# Patient Record
Sex: MALE | Race: WHITE | NOT HISPANIC OR LATINO | URBAN - METROPOLITAN AREA
[De-identification: names, ages, dates, MRNs, and addresses within clinical notes are randomized per-mention and may not be internally consistent; named-entity substitution may affect disease eponyms.]

---

## 2017-02-09 ENCOUNTER — COMMUNICATION - HEALTHEAST (OUTPATIENT)
Dept: FAMILY MEDICINE | Facility: CLINIC | Age: 22
End: 2017-02-09

## 2017-02-24 ENCOUNTER — OFFICE VISIT - HEALTHEAST (OUTPATIENT)
Dept: FAMILY MEDICINE | Facility: CLINIC | Age: 22
End: 2017-02-24

## 2017-02-24 DIAGNOSIS — M54.50 LOW BACK PAIN: ICD-10-CM

## 2017-04-06 ENCOUNTER — OFFICE VISIT - HEALTHEAST (OUTPATIENT)
Dept: FAMILY MEDICINE | Facility: CLINIC | Age: 22
End: 2017-04-06

## 2017-04-06 DIAGNOSIS — M54.9 BACK PAIN: ICD-10-CM

## 2017-04-06 DIAGNOSIS — M54.32 SCIATICA OF LEFT SIDE: ICD-10-CM

## 2017-04-06 RX ORDER — GABAPENTIN 100 MG/1
CAPSULE ORAL
Qty: 90 CAPSULE | Refills: 5 | Status: SHIPPED | OUTPATIENT
Start: 2017-04-06 | End: 2022-02-03

## 2017-04-14 ENCOUNTER — RECORDS - HEALTHEAST (OUTPATIENT)
Dept: ADMINISTRATIVE | Facility: OTHER | Age: 22
End: 2017-04-14

## 2017-04-21 ENCOUNTER — COMMUNICATION - HEALTHEAST (OUTPATIENT)
Dept: FAMILY MEDICINE | Facility: CLINIC | Age: 22
End: 2017-04-21

## 2017-05-25 ENCOUNTER — RECORDS - HEALTHEAST (OUTPATIENT)
Dept: ADMINISTRATIVE | Facility: OTHER | Age: 22
End: 2017-05-25

## 2018-02-19 ENCOUNTER — COMMUNICATION - HEALTHEAST (OUTPATIENT)
Dept: FAMILY MEDICINE | Facility: CLINIC | Age: 23
End: 2018-02-19

## 2018-02-20 ENCOUNTER — OFFICE VISIT - HEALTHEAST (OUTPATIENT)
Dept: FAMILY MEDICINE | Facility: CLINIC | Age: 23
End: 2018-02-20

## 2018-02-20 DIAGNOSIS — M54.50 LOW BACK PAIN: ICD-10-CM

## 2018-02-20 RX ORDER — CYCLOBENZAPRINE HCL 10 MG
10 TABLET ORAL PRN
Qty: 20 TABLET | Refills: 0 | Status: SHIPPED | OUTPATIENT
Start: 2018-02-20 | End: 2022-02-03

## 2018-03-13 ENCOUNTER — HOSPITAL ENCOUNTER (OUTPATIENT)
Dept: MRI IMAGING | Facility: CLINIC | Age: 23
Discharge: HOME | End: 2018-03-13
Attending: FAMILY MEDICINE

## 2018-03-13 DIAGNOSIS — M54.50 LOW BACK PAIN: ICD-10-CM

## 2018-03-14 ENCOUNTER — COMMUNICATION - HEALTHEAST (OUTPATIENT)
Dept: FAMILY MEDICINE | Facility: CLINIC | Age: 23
End: 2018-03-14

## 2018-04-26 ENCOUNTER — RECORDS - HEALTHEAST (OUTPATIENT)
Dept: GENERAL RADIOLOGY | Facility: CLINIC | Age: 23
End: 2018-04-26

## 2018-04-26 ENCOUNTER — OFFICE VISIT - HEALTHEAST (OUTPATIENT)
Dept: RHEUMATOLOGY | Facility: CLINIC | Age: 23
End: 2018-04-26

## 2018-04-26 DIAGNOSIS — M76.32 ILIOTIBIAL BAND SYNDROME OF BOTH SIDES: ICD-10-CM

## 2018-04-26 DIAGNOSIS — E55.9 VITAMIN D DEFICIENCY: ICD-10-CM

## 2018-04-26 DIAGNOSIS — M25.562 CHRONIC PAIN OF LEFT KNEE: ICD-10-CM

## 2018-04-26 DIAGNOSIS — M76.31 ILIOTIBIAL BAND SYNDROME OF BOTH SIDES: ICD-10-CM

## 2018-04-26 DIAGNOSIS — Z15.89 HLA B27 POSITIVE: ICD-10-CM

## 2018-04-26 DIAGNOSIS — M25.562 PAIN IN LEFT KNEE: ICD-10-CM

## 2018-04-26 DIAGNOSIS — M54.42 CHRONIC LOW BACK PAIN WITH LEFT-SIDED SCIATICA: ICD-10-CM

## 2018-04-26 DIAGNOSIS — L70.9 ACNE, UNSPECIFIED ACNE TYPE: ICD-10-CM

## 2018-04-26 DIAGNOSIS — R20.2 LEFT LEG PARESTHESIAS: ICD-10-CM

## 2018-04-26 DIAGNOSIS — Z78.9 REGULAR ALCOHOL CONSUMPTION: ICD-10-CM

## 2018-04-26 DIAGNOSIS — G89.29 CHRONIC LOW BACK PAIN WITH LEFT-SIDED SCIATICA: ICD-10-CM

## 2018-04-26 DIAGNOSIS — G89.29 CHRONIC PAIN OF LEFT KNEE: ICD-10-CM

## 2018-04-26 DIAGNOSIS — R19.5 LOOSE STOOLS: ICD-10-CM

## 2018-04-26 DIAGNOSIS — G89.29 OTHER CHRONIC PAIN: ICD-10-CM

## 2018-04-26 LAB
ALBUMIN SERPL-MCNC: 4 G/DL (ref 3.5–5)
ALT SERPL W P-5'-P-CCNC: 18 U/L (ref 0–45)
AST SERPL W P-5'-P-CCNC: 16 U/L (ref 0–40)
C REACTIVE PROTEIN LHE: <0.1 MG/DL (ref 0–0.8)
CALCIUM SERPL-MCNC: 9.9 MG/DL (ref 8.5–10.5)
CREAT SERPL-MCNC: 0.99 MG/DL (ref 0.7–1.3)
ERYTHROCYTE [DISTWIDTH] IN BLOOD BY AUTOMATED COUNT: 10.8 % (ref 11–14.5)
ERYTHROCYTE [SEDIMENTATION RATE] IN BLOOD BY WESTERGREN METHOD: 2 MM/HR (ref 0–15)
GFR SERPL CREATININE-BSD FRML MDRD: >60 ML/MIN/1.73M2
HCT VFR BLD AUTO: 44.9 % (ref 40–54)
HGB BLD-MCNC: 15.6 G/DL (ref 14–18)
MCH RBC QN AUTO: 31.4 PG (ref 27–34)
MCHC RBC AUTO-ENTMCNC: 34.7 G/DL (ref 32–36)
MCV RBC AUTO: 91 FL (ref 80–100)
PLATELET # BLD AUTO: 327 THOU/UL (ref 140–440)
PMV BLD AUTO: 7.2 FL (ref 7–10)
RBC # BLD AUTO: 4.95 MILL/UL (ref 4.4–6.2)
WBC: 7.9 THOU/UL (ref 4–11)

## 2018-04-26 ASSESSMENT — MIFFLIN-ST. JEOR: SCORE: 1857.16

## 2018-04-27 LAB — 25(OH)D3 SERPL-MCNC: 10.6 NG/ML (ref 30–80)

## 2018-04-30 ENCOUNTER — COMMUNICATION - HEALTHEAST (OUTPATIENT)
Dept: RHEUMATOLOGY | Facility: CLINIC | Age: 23
End: 2018-04-30

## 2018-04-30 DIAGNOSIS — E55.9 VITAMIN D DEFICIENCY: ICD-10-CM

## 2018-05-10 ENCOUNTER — OFFICE VISIT - HEALTHEAST (OUTPATIENT)
Dept: PHYSICAL THERAPY | Facility: REHABILITATION | Age: 23
End: 2018-05-10

## 2018-05-10 DIAGNOSIS — R53.1 DECREASED STRENGTH: ICD-10-CM

## 2018-05-10 DIAGNOSIS — M54.16 CHRONIC LEFT LUMBAR RADICULOPATHY: ICD-10-CM

## 2018-05-14 ENCOUNTER — HOSPITAL ENCOUNTER (OUTPATIENT)
Dept: MRI IMAGING | Facility: CLINIC | Age: 23
Discharge: HOME | End: 2018-05-14
Attending: INTERNAL MEDICINE

## 2018-05-14 ENCOUNTER — RECORDS - HEALTHEAST (OUTPATIENT)
Dept: ADMINISTRATIVE | Facility: OTHER | Age: 23
End: 2018-05-14

## 2018-05-14 DIAGNOSIS — M54.42 CHRONIC LOW BACK PAIN WITH LEFT-SIDED SCIATICA: ICD-10-CM

## 2018-05-14 DIAGNOSIS — Z15.89 HLA B27 POSITIVE: ICD-10-CM

## 2018-05-14 DIAGNOSIS — G89.29 CHRONIC LOW BACK PAIN WITH LEFT-SIDED SCIATICA: ICD-10-CM

## 2018-05-14 ASSESSMENT — MIFFLIN-ST. JEOR: SCORE: 1886.64

## 2018-05-15 ENCOUNTER — HOSPITAL ENCOUNTER (OUTPATIENT)
Dept: PHYSICAL MEDICINE AND REHAB | Facility: CLINIC | Age: 23
Discharge: HOME | End: 2018-05-15
Attending: INTERNAL MEDICINE

## 2018-05-15 DIAGNOSIS — G89.29 CHRONIC LEFT-SIDED LOW BACK PAIN WITH LEFT-SIDED SCIATICA: ICD-10-CM

## 2018-05-15 DIAGNOSIS — M54.42 CHRONIC LOW BACK PAIN WITH LEFT-SIDED SCIATICA: ICD-10-CM

## 2018-05-15 DIAGNOSIS — R20.2 LEFT LEG PARESTHESIAS: ICD-10-CM

## 2018-05-15 DIAGNOSIS — G89.29 CHRONIC LOW BACK PAIN WITH LEFT-SIDED SCIATICA: ICD-10-CM

## 2018-05-15 DIAGNOSIS — M54.42 CHRONIC LEFT-SIDED LOW BACK PAIN WITH LEFT-SIDED SCIATICA: ICD-10-CM

## 2018-05-17 ENCOUNTER — COMMUNICATION - HEALTHEAST (OUTPATIENT)
Dept: RHEUMATOLOGY | Facility: CLINIC | Age: 23
End: 2018-05-17

## 2018-05-18 ENCOUNTER — COMMUNICATION - HEALTHEAST (OUTPATIENT)
Dept: RHEUMATOLOGY | Facility: CLINIC | Age: 23
End: 2018-05-18

## 2018-06-20 ENCOUNTER — OFFICE VISIT - HEALTHEAST (OUTPATIENT)
Dept: RHEUMATOLOGY | Facility: CLINIC | Age: 23
End: 2018-06-20

## 2018-06-20 ENCOUNTER — COMMUNICATION - HEALTHEAST (OUTPATIENT)
Dept: TELEHEALTH | Facility: CLINIC | Age: 23
End: 2018-06-20

## 2018-06-20 DIAGNOSIS — M76.31 ILIOTIBIAL BAND SYNDROME OF BOTH SIDES: ICD-10-CM

## 2018-06-20 DIAGNOSIS — G89.29 CHRONIC BILATERAL LOW BACK PAIN WITH LEFT-SIDED SCIATICA: ICD-10-CM

## 2018-06-20 DIAGNOSIS — Z78.9 REGULAR ALCOHOL CONSUMPTION: ICD-10-CM

## 2018-06-20 DIAGNOSIS — Z79.899 HIGH RISK MEDICATION USE: ICD-10-CM

## 2018-06-20 DIAGNOSIS — R19.5 LOOSE STOOLS: ICD-10-CM

## 2018-06-20 DIAGNOSIS — M45.8 ANKYLOSING SPONDYLITIS OF SACRAL REGION (H): ICD-10-CM

## 2018-06-20 DIAGNOSIS — M54.42 CHRONIC BILATERAL LOW BACK PAIN WITH LEFT-SIDED SCIATICA: ICD-10-CM

## 2018-06-20 DIAGNOSIS — M76.32 ILIOTIBIAL BAND SYNDROME OF BOTH SIDES: ICD-10-CM

## 2018-06-20 DIAGNOSIS — L70.9 ACNE, UNSPECIFIED ACNE TYPE: ICD-10-CM

## 2018-06-20 ASSESSMENT — MIFFLIN-ST. JEOR: SCORE: 1895.72

## 2018-06-21 LAB
HBV SURFACE AG SERPL QL IA: NEGATIVE
HCV AB SERPL QL IA: NEGATIVE

## 2018-06-22 LAB
G6PD RBC-CCNT: 9.6 U/G HB (ref 8.8–13.4)
QTF INTERPRETATION: NORMAL
QTF MITOGEN - NIL: 8.44 IU/ML
QTF NIL: 0.04 IU/ML
QTF RESULT: NEGATIVE
QTF TB ANTIGEN - NIL: 0.02 IU/ML

## 2018-06-25 ENCOUNTER — COMMUNICATION - HEALTHEAST (OUTPATIENT)
Dept: RHEUMATOLOGY | Facility: CLINIC | Age: 23
End: 2018-06-25

## 2018-09-17 ENCOUNTER — OFFICE VISIT - HEALTHEAST (OUTPATIENT)
Dept: RHEUMATOLOGY | Facility: CLINIC | Age: 23
End: 2018-09-17

## 2018-09-17 ENCOUNTER — RECORDS - HEALTHEAST (OUTPATIENT)
Dept: GENERAL RADIOLOGY | Facility: CLINIC | Age: 23
End: 2018-09-17

## 2018-09-17 DIAGNOSIS — Z79.899 OTHER LONG TERM (CURRENT) DRUG THERAPY: ICD-10-CM

## 2018-09-17 DIAGNOSIS — M45.8 ANKYLOSING SPONDYLITIS OF SACRAL REGION (H): ICD-10-CM

## 2018-09-17 DIAGNOSIS — M45.8 ANKYLOSING SPONDYLITIS SACRAL AND SACROCOCCYGEAL REGION (H): ICD-10-CM

## 2018-09-17 DIAGNOSIS — Z79.899 HIGH RISK MEDICATION USE: ICD-10-CM

## 2018-09-17 LAB
ALBUMIN SERPL-MCNC: 4.3 G/DL (ref 3.5–5)
ALT SERPL W P-5'-P-CCNC: 17 U/L (ref 0–45)
AST SERPL W P-5'-P-CCNC: 16 U/L (ref 0–40)
CREAT SERPL-MCNC: 0.83 MG/DL (ref 0.7–1.3)
ERYTHROCYTE [DISTWIDTH] IN BLOOD BY AUTOMATED COUNT: 11.7 % (ref 11–14.5)
GFR SERPL CREATININE-BSD FRML MDRD: >60 ML/MIN/1.73M2
HCT VFR BLD AUTO: 45.8 % (ref 40–54)
HGB BLD-MCNC: 15.4 G/DL (ref 14–18)
MCH RBC QN AUTO: 31.4 PG (ref 27–34)
MCHC RBC AUTO-ENTMCNC: 33.6 G/DL (ref 32–36)
MCV RBC AUTO: 93 FL (ref 80–100)
PLATELET # BLD AUTO: 262 THOU/UL (ref 140–440)
PMV BLD AUTO: 7.6 FL (ref 7–10)
RBC # BLD AUTO: 4.9 MILL/UL (ref 4.4–6.2)
WBC: 7.1 THOU/UL (ref 4–11)

## 2018-09-17 ASSESSMENT — MIFFLIN-ST. JEOR: SCORE: 1868.5

## 2018-10-29 ENCOUNTER — COMMUNICATION - HEALTHEAST (OUTPATIENT)
Dept: ADMINISTRATIVE | Facility: CLINIC | Age: 23
End: 2018-10-29

## 2018-10-29 DIAGNOSIS — M45.8 ANKYLOSING SPONDYLITIS OF SACRAL REGION (H): ICD-10-CM

## 2018-10-30 ENCOUNTER — COMMUNICATION - HEALTHEAST (OUTPATIENT)
Dept: ADMINISTRATIVE | Facility: CLINIC | Age: 23
End: 2018-10-30

## 2018-10-30 DIAGNOSIS — M45.8 ANKYLOSING SPONDYLITIS OF SACRAL REGION (H): ICD-10-CM

## 2019-04-19 ENCOUNTER — AMBULATORY - HEALTHEAST (OUTPATIENT)
Dept: LAB | Facility: CLINIC | Age: 24
End: 2019-04-19

## 2019-04-19 DIAGNOSIS — Z79.899 HIGH RISK MEDICATION USE: ICD-10-CM

## 2019-04-19 DIAGNOSIS — M45.8 ANKYLOSING SPONDYLITIS OF SACRAL REGION (H): ICD-10-CM

## 2019-04-19 LAB
ALBUMIN SERPL-MCNC: 4.3 G/DL (ref 3.5–5)
ALT SERPL W P-5'-P-CCNC: 213 U/L (ref 0–45)
AST SERPL W P-5'-P-CCNC: 409 U/L (ref 0–40)
CREAT SERPL-MCNC: 1.1 MG/DL (ref 0.7–1.3)
ERYTHROCYTE [DISTWIDTH] IN BLOOD BY AUTOMATED COUNT: 11.7 % (ref 11–14.5)
GFR SERPL CREATININE-BSD FRML MDRD: >60 ML/MIN/1.73M2
HCT VFR BLD AUTO: 47.8 % (ref 40–54)
HGB BLD-MCNC: 16.1 G/DL (ref 14–18)
MCH RBC QN AUTO: 31 PG (ref 27–34)
MCHC RBC AUTO-ENTMCNC: 33.6 G/DL (ref 32–36)
MCV RBC AUTO: 92 FL (ref 80–100)
PLATELET # BLD AUTO: 307 THOU/UL (ref 140–440)
PMV BLD AUTO: 7.8 FL (ref 7–10)
RBC # BLD AUTO: 5.17 MILL/UL (ref 4.4–6.2)
WBC: 7.8 THOU/UL (ref 4–11)

## 2019-04-22 ENCOUNTER — COMMUNICATION - HEALTHEAST (OUTPATIENT)
Dept: RHEUMATOLOGY | Facility: CLINIC | Age: 24
End: 2019-04-22

## 2019-04-24 ENCOUNTER — HOSPITAL ENCOUNTER (OUTPATIENT)
Dept: ULTRASOUND IMAGING | Facility: CLINIC | Age: 24
Discharge: HOME | End: 2019-04-24
Attending: FAMILY MEDICINE

## 2019-04-24 ENCOUNTER — OFFICE VISIT - HEALTHEAST (OUTPATIENT)
Dept: FAMILY MEDICINE | Facility: CLINIC | Age: 24
End: 2019-04-24

## 2019-04-24 ENCOUNTER — COMMUNICATION - HEALTHEAST (OUTPATIENT)
Dept: FAMILY MEDICINE | Facility: CLINIC | Age: 24
End: 2019-04-24

## 2019-04-24 DIAGNOSIS — R74.8 ELEVATED LIVER ENZYMES: ICD-10-CM

## 2019-04-24 LAB
ALBUMIN SERPL-MCNC: 4.3 G/DL (ref 3.5–5)
ALP SERPL-CCNC: 57 U/L (ref 45–120)
ALT SERPL W P-5'-P-CCNC: 98 U/L (ref 0–45)
ANION GAP SERPL CALCULATED.3IONS-SCNC: 9 MMOL/L (ref 5–18)
AST SERPL W P-5'-P-CCNC: 46 U/L (ref 0–40)
BILIRUB SERPL-MCNC: 0.8 MG/DL (ref 0–1)
BUN SERPL-MCNC: 17 MG/DL (ref 8–22)
CALCIUM SERPL-MCNC: 10.1 MG/DL (ref 8.5–10.5)
CHLORIDE BLD-SCNC: 102 MMOL/L (ref 98–107)
CHOLEST SERPL-MCNC: 208 MG/DL
CO2 SERPL-SCNC: 28 MMOL/L (ref 22–31)
CREAT SERPL-MCNC: 1.08 MG/DL (ref 0.7–1.3)
FASTING STATUS PATIENT QL REPORTED: YES
GFR SERPL CREATININE-BSD FRML MDRD: >60 ML/MIN/1.73M2
GGT SERPL-CCNC: 37 U/L (ref 0–50)
GLUCOSE BLD-MCNC: 97 MG/DL (ref 70–125)
HDLC SERPL-MCNC: 38 MG/DL
LDLC SERPL CALC-MCNC: 128 MG/DL
POTASSIUM BLD-SCNC: 4.1 MMOL/L (ref 3.5–5)
PROT SERPL-MCNC: 7.1 G/DL (ref 6–8)
SODIUM SERPL-SCNC: 139 MMOL/L (ref 136–145)
TRIGL SERPL-MCNC: 212 MG/DL

## 2019-04-26 ENCOUNTER — COMMUNICATION - HEALTHEAST (OUTPATIENT)
Dept: FAMILY MEDICINE | Facility: CLINIC | Age: 24
End: 2019-04-26

## 2019-04-26 LAB
HAV IGM SERPL QL IA: NEGATIVE
HBV CORE IGM SERPL QL IA: NEGATIVE
HBV SURFACE AG SERPL QL IA: NEGATIVE
HCV AB SERPL QL IA: NEGATIVE
MITOCHONDRIAL M2 ABY IGG: <1 U/ML

## 2019-05-09 ENCOUNTER — COMMUNICATION - HEALTHEAST (OUTPATIENT)
Dept: RHEUMATOLOGY | Facility: CLINIC | Age: 24
End: 2019-05-09

## 2019-05-09 DIAGNOSIS — M45.8 ANKYLOSING SPONDYLITIS OF SACRAL REGION (H): ICD-10-CM

## 2019-05-16 ENCOUNTER — COMMUNICATION - HEALTHEAST (OUTPATIENT)
Dept: RHEUMATOLOGY | Facility: CLINIC | Age: 24
End: 2019-05-16

## 2019-05-16 DIAGNOSIS — M45.8 ANKYLOSING SPONDYLITIS OF SACRAL REGION (H): ICD-10-CM

## 2019-05-20 ENCOUNTER — RECORDS - HEALTHEAST (OUTPATIENT)
Dept: ADMINISTRATIVE | Facility: OTHER | Age: 24
End: 2019-05-20

## 2019-05-28 ENCOUNTER — COMMUNICATION - HEALTHEAST (OUTPATIENT)
Dept: FAMILY MEDICINE | Facility: CLINIC | Age: 24
End: 2019-05-28

## 2019-05-30 ENCOUNTER — RECORDS - HEALTHEAST (OUTPATIENT)
Dept: ADMINISTRATIVE | Facility: OTHER | Age: 24
End: 2019-05-30

## 2019-05-31 ENCOUNTER — AMBULATORY - HEALTHEAST (OUTPATIENT)
Dept: FAMILY MEDICINE | Facility: CLINIC | Age: 24
End: 2019-05-31

## 2019-06-05 ENCOUNTER — OFFICE VISIT - HEALTHEAST (OUTPATIENT)
Dept: RHEUMATOLOGY | Facility: CLINIC | Age: 24
End: 2019-06-05

## 2019-06-05 DIAGNOSIS — M54.42 CHRONIC BILATERAL LOW BACK PAIN WITH LEFT-SIDED SCIATICA: ICD-10-CM

## 2019-06-05 DIAGNOSIS — E55.9 VITAMIN D DEFICIENCY: ICD-10-CM

## 2019-06-05 DIAGNOSIS — M45.8 ANKYLOSING SPONDYLITIS OF SACRAL REGION (H): ICD-10-CM

## 2019-06-05 DIAGNOSIS — Z79.899 HIGH RISK MEDICATION USE: ICD-10-CM

## 2019-06-05 DIAGNOSIS — G89.29 CHRONIC BILATERAL LOW BACK PAIN WITH LEFT-SIDED SCIATICA: ICD-10-CM

## 2019-07-03 ENCOUNTER — AMBULATORY - HEALTHEAST (OUTPATIENT)
Dept: LAB | Facility: CLINIC | Age: 24
End: 2019-07-03

## 2019-07-03 DIAGNOSIS — E55.9 VITAMIN D DEFICIENCY: ICD-10-CM

## 2019-07-03 DIAGNOSIS — M45.8 ANKYLOSING SPONDYLITIS OF SACRAL REGION (H): ICD-10-CM

## 2019-07-03 DIAGNOSIS — Z79.899 HIGH RISK MEDICATION USE: ICD-10-CM

## 2019-07-03 LAB
ALBUMIN SERPL-MCNC: 3.9 G/DL (ref 3.5–5)
ALT SERPL W P-5'-P-CCNC: 24 U/L (ref 0–45)
AST SERPL W P-5'-P-CCNC: 15 U/L (ref 0–40)
CALCIUM SERPL-MCNC: 9.6 MG/DL (ref 8.5–10.5)
CREAT SERPL-MCNC: 1.17 MG/DL (ref 0.7–1.3)
ERYTHROCYTE [DISTWIDTH] IN BLOOD BY AUTOMATED COUNT: 11.6 % (ref 11–14.5)
GFR SERPL CREATININE-BSD FRML MDRD: >60 ML/MIN/1.73M2
HCT VFR BLD AUTO: 44.1 % (ref 40–54)
HGB BLD-MCNC: 15 G/DL (ref 14–18)
MCH RBC QN AUTO: 31 PG (ref 27–34)
MCHC RBC AUTO-ENTMCNC: 33.9 G/DL (ref 32–36)
MCV RBC AUTO: 91 FL (ref 80–100)
PLATELET # BLD AUTO: 246 THOU/UL (ref 140–440)
PMV BLD AUTO: 7.6 FL (ref 7–10)
RBC # BLD AUTO: 4.83 MILL/UL (ref 4.4–6.2)
WBC: 6.7 THOU/UL (ref 4–11)

## 2019-07-05 LAB — 25(OH)D3 SERPL-MCNC: 8.9 NG/ML (ref 30–80)

## 2019-07-15 ENCOUNTER — COMMUNICATION - HEALTHEAST (OUTPATIENT)
Dept: RHEUMATOLOGY | Facility: CLINIC | Age: 24
End: 2019-07-15

## 2019-07-15 DIAGNOSIS — E55.9 VITAMIN D DEFICIENCY: ICD-10-CM

## 2019-07-18 RX ORDER — ERGOCALCIFEROL 1.25 MG/1
CAPSULE ORAL
Qty: 16 CAPSULE | Refills: 0 | Status: SHIPPED | OUTPATIENT
Start: 2019-07-18 | End: 2022-02-03

## 2019-09-11 ENCOUNTER — OFFICE VISIT - HEALTHEAST (OUTPATIENT)
Dept: RHEUMATOLOGY | Facility: CLINIC | Age: 24
End: 2019-09-11

## 2019-09-11 ENCOUNTER — COMMUNICATION - HEALTHEAST (OUTPATIENT)
Dept: TELEHEALTH | Facility: CLINIC | Age: 24
End: 2019-09-11

## 2019-09-11 DIAGNOSIS — Z79.899 HIGH RISK MEDICATION USE: ICD-10-CM

## 2019-09-11 DIAGNOSIS — M45.8 ANKYLOSING SPONDYLITIS OF SACRAL REGION (H): ICD-10-CM

## 2019-09-11 DIAGNOSIS — G89.29 CHRONIC BILATERAL LOW BACK PAIN WITH LEFT-SIDED SCIATICA: ICD-10-CM

## 2019-09-11 DIAGNOSIS — M54.42 CHRONIC BILATERAL LOW BACK PAIN WITH LEFT-SIDED SCIATICA: ICD-10-CM

## 2019-09-11 RX ORDER — MELOXICAM 7.5 MG/1
TABLET ORAL
Qty: 180 TABLET | Refills: 1 | Status: SHIPPED | OUTPATIENT
Start: 2019-09-11 | End: 2022-02-03

## 2019-09-11 ASSESSMENT — MIFFLIN-ST. JEOR: SCORE: 1961.49

## 2019-10-02 ENCOUNTER — AMBULATORY - HEALTHEAST (OUTPATIENT)
Dept: LAB | Facility: CLINIC | Age: 24
End: 2019-10-02

## 2019-10-02 DIAGNOSIS — Z79.899 HIGH RISK MEDICATION USE: ICD-10-CM

## 2019-10-02 DIAGNOSIS — E55.9 VITAMIN D DEFICIENCY: ICD-10-CM

## 2019-10-02 LAB
ALBUMIN SERPL-MCNC: 4.3 G/DL (ref 3.5–5)
ALT SERPL W P-5'-P-CCNC: 23 U/L (ref 0–45)
AST SERPL W P-5'-P-CCNC: 16 U/L (ref 0–40)
CREAT SERPL-MCNC: 1.12 MG/DL (ref 0.7–1.3)
ERYTHROCYTE [DISTWIDTH] IN BLOOD BY AUTOMATED COUNT: 12.1 % (ref 11–14.5)
GFR SERPL CREATININE-BSD FRML MDRD: >60 ML/MIN/1.73M2
HCT VFR BLD AUTO: 43.4 % (ref 40–54)
HGB BLD-MCNC: 15 G/DL (ref 14–18)
MCH RBC QN AUTO: 33.2 PG (ref 27–34)
MCHC RBC AUTO-ENTMCNC: 34.6 G/DL (ref 32–36)
MCV RBC AUTO: 96 FL (ref 80–100)
PLATELET # BLD AUTO: 242 THOU/UL (ref 140–440)
PMV BLD AUTO: 7.7 FL (ref 7–10)
RBC # BLD AUTO: 4.53 MILL/UL (ref 4.4–6.2)
WBC: 7.2 THOU/UL (ref 4–11)

## 2019-10-03 LAB — 25(OH)D3 SERPL-MCNC: 33.6 NG/ML (ref 30–80)

## 2019-10-14 ENCOUNTER — COMMUNICATION - HEALTHEAST (OUTPATIENT)
Dept: RHEUMATOLOGY | Facility: CLINIC | Age: 24
End: 2019-10-14

## 2019-12-18 ENCOUNTER — COMMUNICATION - HEALTHEAST (OUTPATIENT)
Dept: TELEHEALTH | Facility: CLINIC | Age: 24
End: 2019-12-18

## 2019-12-18 ENCOUNTER — OFFICE VISIT - HEALTHEAST (OUTPATIENT)
Dept: RHEUMATOLOGY | Facility: CLINIC | Age: 24
End: 2019-12-18

## 2019-12-18 DIAGNOSIS — G89.29 CHRONIC BILATERAL LOW BACK PAIN WITH LEFT-SIDED SCIATICA: ICD-10-CM

## 2019-12-18 DIAGNOSIS — M45.8 ANKYLOSING SPONDYLITIS OF SACRAL REGION (H): ICD-10-CM

## 2019-12-18 DIAGNOSIS — M54.42 CHRONIC BILATERAL LOW BACK PAIN WITH LEFT-SIDED SCIATICA: ICD-10-CM

## 2019-12-18 DIAGNOSIS — Z79.899 HIGH RISK MEDICATION USE: ICD-10-CM

## 2019-12-18 DIAGNOSIS — E55.9 VITAMIN D DEFICIENCY: ICD-10-CM

## 2019-12-18 ASSESSMENT — MIFFLIN-ST. JEOR: SCORE: 1959.22

## 2020-01-02 ENCOUNTER — AMBULATORY - HEALTHEAST (OUTPATIENT)
Dept: LAB | Facility: CLINIC | Age: 25
End: 2020-01-02

## 2020-01-02 DIAGNOSIS — M45.8 ANKYLOSING SPONDYLITIS OF SACRAL REGION (H): ICD-10-CM

## 2020-01-02 DIAGNOSIS — Z79.899 HIGH RISK MEDICATION USE: ICD-10-CM

## 2020-01-02 LAB
ALBUMIN SERPL-MCNC: 4.1 G/DL (ref 3.5–5)
ALT SERPL W P-5'-P-CCNC: 55 U/L (ref 0–45)
AST SERPL W P-5'-P-CCNC: 26 U/L (ref 0–40)
CREAT SERPL-MCNC: 1.1 MG/DL (ref 0.7–1.3)
ERYTHROCYTE [DISTWIDTH] IN BLOOD BY AUTOMATED COUNT: 10.8 % (ref 11–14.5)
GFR SERPL CREATININE-BSD FRML MDRD: >60 ML/MIN/1.73M2
HCT VFR BLD AUTO: 45.5 % (ref 40–54)
HGB BLD-MCNC: 16.1 G/DL (ref 14–18)
MCH RBC QN AUTO: 33.9 PG (ref 27–34)
MCHC RBC AUTO-ENTMCNC: 35.3 G/DL (ref 32–36)
MCV RBC AUTO: 96 FL (ref 80–100)
PLATELET # BLD AUTO: 300 THOU/UL (ref 140–440)
PMV BLD AUTO: 7.7 FL (ref 7–10)
RBC # BLD AUTO: 4.74 MILL/UL (ref 4.4–6.2)
WBC: 7.7 THOU/UL (ref 4–11)

## 2020-01-14 ENCOUNTER — COMMUNICATION - HEALTHEAST (OUTPATIENT)
Dept: RHEUMATOLOGY | Facility: CLINIC | Age: 25
End: 2020-01-14

## 2020-01-14 DIAGNOSIS — M45.8 ANKYLOSING SPONDYLITIS OF SACRAL REGION (H): ICD-10-CM

## 2020-01-15 ENCOUNTER — COMMUNICATION - HEALTHEAST (OUTPATIENT)
Dept: RHEUMATOLOGY | Facility: CLINIC | Age: 25
End: 2020-01-15

## 2020-01-15 DIAGNOSIS — M45.8 ANKYLOSING SPONDYLITIS OF SACRAL REGION (H): ICD-10-CM

## 2020-01-15 DIAGNOSIS — Z79.899 HIGH RISK MEDICATION USE: ICD-10-CM

## 2020-01-15 RX ORDER — SULFASALAZINE 500 MG/1
1500 TABLET ORAL 2 TIMES DAILY
Qty: 540 TABLET | Refills: 0 | Status: SHIPPED | OUTPATIENT
Start: 2020-01-15 | End: 2022-02-24

## 2020-01-28 ENCOUNTER — COMMUNICATION - HEALTHEAST (OUTPATIENT)
Dept: RHEUMATOLOGY | Facility: CLINIC | Age: 25
End: 2020-01-28

## 2020-05-20 ENCOUNTER — COMMUNICATION - HEALTHEAST (OUTPATIENT)
Dept: RHEUMATOLOGY | Facility: CLINIC | Age: 25
End: 2020-05-20

## 2020-06-30 ENCOUNTER — COMMUNICATION - HEALTHEAST (OUTPATIENT)
Dept: RHEUMATOLOGY | Facility: CLINIC | Age: 25
End: 2020-06-30

## 2020-06-30 DIAGNOSIS — M45.8 ANKYLOSING SPONDYLITIS OF SACRAL REGION (H): ICD-10-CM

## 2021-05-28 NOTE — TELEPHONE ENCOUNTER
Last ov- 9/17/18  Last labs- 4/24/19    Future appt- 6/5/19    Please advise sulfasalazine refill, pt was told to hold sulfasalazine on 4/22/19 due to elevated LFTs and see PCP.  Pt did follow up with PCP, notes look like she okayed pt to resume sulfasalazine in encounter on 4/24/19. please advise.

## 2021-05-28 NOTE — TELEPHONE ENCOUNTER
Question following Office Visit  When did you see your provider: today  What is your question: Please advise as patient was instructed to stay off all medications but he needs to continue his  sulfaSALAzine (AZULFIDINE) 500 mg tablet 540 tablet 0 10/30/2018 1/28/2019    Sig - Route: Take 3 tablets (1,500 mg total) by mouth 2 (two) times a day. - Oral    Sent to pharmacy as: sulfaSALAzine (AZULFIDINE) 500 mg tablet    Cosign for Ordering: Accepted by Rodrigo Morfin DO on 10/31/2018 12:06 AM    This is needed to prevent an inflammation of his AS.   Please reach out to patient with plan of care.   Okay to leave a detailed message: Yes

## 2021-05-28 NOTE — TELEPHONE ENCOUNTER
Pt notified of lab results and recommendations to hold meloxicam and sulfasalazine and see PCP this week. Pt also admits to drinking alcohol, advised pt to not consume alcohol or Tylenol due to elevated liver enzymes. Pt verbalized understanding and will call to get in to see PCP.

## 2021-05-28 NOTE — TELEPHONE ENCOUNTER
Pt states he was off sulfasalazine for a week when his LFTs were elevated, his PCP told him he could resume the sulfasalazine after second lab test and numbers did come down a bit but are still elevated. Pt states he went back on sulfasalazine but no has run out. Pt notified of Dr Morfin's recommendations below, pt will have labs drawn next week and keep appt on 6/5 and understands he wont be refilling sulfasalazine until appt.

## 2021-05-28 NOTE — PROGRESS NOTES
Complaint: Follow-up dramatically elevated liver enzymes    HPI: The patient was seen by his rheumatologist and labs drawn last Friday, April 19 showed dramatically elevated ALT and AST.  They wanted him to follow-up with his PCP for further evaluation.    He has no risk factors for hepatitis but we will check that.  He does drink alcohol daily and will at times drink may be 3 or 4 beverages over many hours.  Also is doing some alcohol likely every day.  He tells me that he was using protein shakes and some sort of pre-workout supplement while he was trying to build body mass.  He has been using a lot of nonsteroidal anti-inflammatory drugs for his back pain and he does need to be on sulfasalazine.  He was on the sulfasalazine when his labs were normal last fall.  The nutritional supplements for weightlifting are a relatively new thing and I want him to stop that while we are investigating this.    He has not had vomiting or diarrhea.  He has not had any change in his skin color or bowel habits at all.  There is no concurrent fluid retention with ankle swelling shortness of breath chest pains increased abdominal girth.  He does not use IV drugs.    Objective:/63 (Patient Site: Right Arm, Patient Position: Sitting, Cuff Size: Adult Regular)   Pulse 80   Wt 201 lb 12.8 oz (91.5 kg)   SpO2 97%   BMI 28.15 kg/m    He has a couple of tattoos but knows how to go about that safely.    Recent Results (from the past 24 hour(s))   Comprehensive Metabolic Panel   Result Value Ref Range    Sodium 139 136 - 145 mmol/L    Potassium 4.1 3.5 - 5.0 mmol/L    Chloride 102 98 - 107 mmol/L    CO2 28 22 - 31 mmol/L    Anion Gap, Calculation 9 5 - 18 mmol/L    Glucose 97 70 - 125 mg/dL    BUN 17 8 - 22 mg/dL    Creatinine 1.08 0.70 - 1.30 mg/dL    GFR MDRD Af Amer >60 >60 mL/min/1.73m2    GFR MDRD Non Af Amer >60 >60 mL/min/1.73m2    Bilirubin, Total 0.8 0.0 - 1.0 mg/dL    Calcium 10.1 8.5 - 10.5 mg/dL    Protein, Total 7.1 6.0  - 8.0 g/dL    Albumin 4.3 3.5 - 5.0 g/dL    Alkaline Phosphatase 57 45 - 120 U/L    AST 46 (H) 0 - 40 U/L    ALT 98 (H) 0 - 45 U/L   GGT (Gamma GT)   Result Value Ref Range    GGT (Gamma GT) 37 0 - 50 U/L     His ultrasound showed no obvious cause for thyroid abnormality today.  I sent him today for an urgent a hold and call ultrasound so that we could evaluate as quickly as possible.    Assessment: Elevated liver enzymes  Chronic low back pain    Plan: I am relieved that at least the initial numbers are dramatically better than they had been last Friday.  I do not know if it was an error in the lab or what it was related to but I am and have him stay off the nutritional supplements.  And I want to check the labs again at his local hospital in about 3 weeks to make sure that they continue to be normal.  If something else shows up in that 3 weeks time I will do further investigation but right now I do not have an obvious cause for this so I do not think I need to do a gastroenterology consult at this time.

## 2021-05-28 NOTE — TELEPHONE ENCOUNTER
Unfortunately we could not prescribe sulfasalazine until liver enzymes have normalized.      It's been 3 to 4 weeks since last check, we can recheck AST/ALT and albumin levels and if within normal limits he can request refill.    Please inquire if he discontinued alcohol beverage intake?  If not, how much is he taking daily?      With sulfasalazine it is recommendable to avoid alcoholic beverages as combination of both increases risk for liver toxicity.

## 2021-05-28 NOTE — TELEPHONE ENCOUNTER
Left message for pt to call back for lab results.    Pt had labs drawn 4/19/19- Dr Palmer reviewed labs in Dr Morfin absence, ALT and AST are elevated, Dr Palmer recommends pt hold Sulfasalazine and meloxicam and see PCP this week to be evaluated and follow up with Dr Morfin when he is back in clinic as scheduled.

## 2021-05-29 NOTE — TELEPHONE ENCOUNTER
We faxed orders in April that were supposed to be done in about 3 weeks. Did the lab ever find the orders that were faxed?

## 2021-05-29 NOTE — TELEPHONE ENCOUNTER
Orders being requested: Standing lab orders related to liver workup and recent results  Reason service is needed/diagnosis: Patient has a follow up appointment with Rheumatology last week and states Celeste Churchill MD wanted additional labs done so they are ready for Rheumatology appointment.  Patient was at the [facility] clinic at the time of call expecting there to be lab orders at the clinic.   When are orders needed by: ASAP - patient has Rheumatology follow up last week and went to [place] today for lab draw.  Where to send Orders: Fax: [facility] in  [place] [phone]  Okay to leave detailed message?  Yes

## 2021-05-29 NOTE — PROGRESS NOTES
"Blake Allen who presents today with a chief complaint of  Follow-up      Joint Pains: Yes  Location: Back, hips  Onset: chronic (3 years)  Intensity:  3-7/10  AM Stiffness:15 Minutes  Alleviating/Aggravating Factors: Medications helpful  Tolerating Meds: Yes  Other:      ROS:  Patient denies having any active: chest pain, shortness of breath, +cough \"allergy related\", No: abdominal pain, nausea, vomiting, rashes, documented fevers, oral ulcers and recent infections.  Patient admits to having a good appetite  Information gathered by medical assistant incorporated into this note, was reviewed and discussed with the patient.    Problem List:  Patient Active Problem List   Diagnosis     Migraine Headache        PMH:   No past medical history on file.    Surgical History:  Past Surgical History:   Procedure Laterality Date     [place] REMOVAL ADENOIDS,PRIMARY,<13 Y/O      Description: Adenoidectomy;  Recorded: 07/23/2008;       Family History:  Family History   Problem Relation Age of Onset     Ankylosing spondylitis Mother        Social History:   reports that he has quit smoking. He has never used smokeless tobacco.    Allergies:  No Known Allergies     Current Medications:  Current Outpatient Medications   Medication Sig Dispense Refill     meloxicam (MOBIC) 7.5 MG tablet Take 1 tablet p.o. twice daily or 2 tabs p.o. daily, prn. Take with food. 60 tablet 2     cyclobenzaprine (FLEXERIL) 10 MG tablet Take 1 tablet (10 mg total) by mouth as needed. 20 tablet 0     ergocalciferol (VITAMIN D2) 50,000 unit capsule One capsule Mon and Thurs for 4 weeks. After 4 weeks take once a week for 8 weeks. 16 capsule 0     gabapentin (NEURONTIN) 100 MG capsule 1-3 tablets po at HS as directed 90 capsule 5     ibuprofen (ADVIL,MOTRIN) 200 MG tablet Take 200 mg by mouth every 6 (six) hours as needed for pain.       sulfaSALAzine (AZULFIDINE) 500 mg tablet Take 3 tablets (1,500 mg total) by mouth 2 (two) times a day. 540 tablet 0 " "    No current facility-administered medications for this visit.            Physical Exam:  /80 (Patient Site: Right Arm, Patient Position: Sitting, Cuff Size: Adult Regular)   Pulse 78   Wt 208 lb 4.8 oz (94.5 kg)   BMI 29.05 kg/m    General: A & O x 3 in NAD  HEENT: EOMI, Non injected/non icteric sclera, no oral lesions noted  CV: s1s2 with RRR, no rubs appreciated   Lungs: CTA B/L, no wheezing , rales or rhonci appreciated  GI: Soft, NT/ND, no rebound, no guarding noted  MS: Some mild low back stiffness on straight leg raising, nonradiating.  Otherwise patient demonstrated good passive/active ROM over other joints with no warmth, erythema, tenderness or synovitis noted over these joints.      Summary/Assessment:    History that includes ankylosing spondylitis.    Symptoms have been resurfacing since running out of sulfasalazine about 2 months ago.    In April had moderate case of transaminitis which he states was due to having a few parties/drinking alcoholic beverages over the course of 1 week.  Adds that typically when he is on sulfasalazine he is aware avoid drinking alcoholic beverages regularly or to binge drink.    Repeat liver enzyme levels recently performed, scanned in our system noted to be within normal limits.    States has been taking meloxicam regularly, even when having transaminitis.    Denies taking Tylenol.    Currently not taking any vitamin D supplements.    No clear signs of synovitis noted on exam today.    Please see below for management plan.      From prior note: Claims to be doing \"is 99% better\" since starting sulfasalazine.      Pertinent rheumatology/past medical history (please refer to above for more detailed history):      Ankylosis spondylitis (HLA-B27 positive, positive family history/mother, abnormal imaging)    Chronic low back pain with left radiculopathy    Hx of lumbar herniated disc, lifting injury, 2016    Chronic left knee discomfort, giving out " sensation    Iliotibial band tightness, bilateral    Loose stools    Acne (seen Dermatology, also component of rosacea)    Vitamin D deficiency      Rheumatology medications provided/suggested:    Sulfasalazine  Meloxicam      Pertinent medication from other providers or from otc (please refer to above for more detailed med list):    Flexeril        Pertinent medications already tried:     Neurontin (grogginess)  Advil versus Aleve  Tylenol    Pertinent lab history:      Positive HLA-B27    Noted to have negative/unremarkable: AURELIO, rheumatoid factor, ESR    Low vitamin D      Pertinent imaging/test history:    MRI of lumbar spine from March 2018 compared to MRI from October 2016 showed:  CONCLUSION:  1.  At L5-S1, there is mild degenerative disc disease, and a small shallow central disc protrusion. No associated significant central canal or lateral recess stenosis or evidence for neural impingement.  2.  No other significant abnormality in the lumbar spine.  3.  Increased marrow fat along both SI joints probably related to remote sacroiliitis.     MRI of SI joints from May 2018:  CONCLUSION:  1.  Increased marrow fat along both SI joints probably the sequela of previous, remote sacroiliitis.  2.  Trace marrow edema and enhancement along the inferior aspect of both SI joints. No other signs of active inflammation-sacroiliitis. No synovitis.    EMG study from May 2015: Left lower extremity results were unremarkable.    X-ray of left knee was unremarkable.    Other:    Standing order for labs placed, every 8 weeks good through June 2020.    Did not pursue prior PT referral placed for: Sensations of left knee discomfort/giving out and bilateral iliotibial band tightness (admits to squatting often at work).     Plan:      We will restart sulfasalazine 1500 mg twice daily.  Had lengthy discussion with the patient regarding avoiding alcohol beverage intake while on sulfasalazine, as combination of both can cause liver  toxicity which can lead to liver failure.  Patient states that he is able to significantly limit alcohol beverage intake as he had in the past.    Continue meloxicam for breakthrough pains.       Can continue taking Flexeril 10 mg nightly as needed    We will check labs in 1 month and every 8 weeks thereafter given history of transaminitis.    Follow-up in 3 months.      Procedure note:         Spent 40 minutes with greater than half of this time spent with the patient going over differential diagnosis, prognosis, treatment plan, medication side effects and  answering questions.      This note was transcribed using Dragon voice recognition software as a result unintentional grammatical errors or word substitutions may have occurred. Please contact our Rheumatology department if you need any clarification or if you have any related inquiries.    Major side effect profile of medications provided were discussed with the patient.    Rodrigo Morfin DO....................  6/5/2019   4:58 PM

## 2021-05-29 NOTE — PATIENT INSTRUCTIONS - HE
Summary of Your Rheumatology Visit    Next Appointment:  3 Months    Medications:    Please follow directives on pill bottle on how to take medication(s) provided.      Referrals:      Tests:     Please have labs performed in 4 weeks and every 8 weeks thereafter (prior to visit).       Injections:        Other:    Avoid alcoholic beverage intake, as discussed.    Regarding symptoms of cough, if symptoms persist recommend that you discuss further with your primary care physician, to possibly further evaluate/manage.

## 2021-05-29 NOTE — TELEPHONE ENCOUNTER
Left detailed message for patient informing him that lab orders were resent and Crystal did receive the orders. He is all set to have his labs done.

## 2021-05-30 VITALS — WEIGHT: 192 LBS | BODY MASS INDEX: 27.55 KG/M2

## 2021-05-30 VITALS — BODY MASS INDEX: 27.75 KG/M2 | WEIGHT: 193.4 LBS

## 2021-05-30 NOTE — TELEPHONE ENCOUNTER
Pt notified of lab results and verbalized understanding. rx for Vit D sent to pharmacy and lab order entered in chart per Dr Morfin.

## 2021-05-30 NOTE — TELEPHONE ENCOUNTER
----- Message from Rodrigo Morfin DO sent at 7/10/2019  6:25 PM CDT -----  Vitamin D level was noted to be low, recommend replenishing with 50,000 units Monday and Thursday for 4 weeks thereafter once a week for 8 weeks for total of 16 doses thereafter recheck level.      Otherwise remainder of lab results were within normal limits.

## 2021-05-31 VITALS — BODY MASS INDEX: 27.69 KG/M2 | WEIGHT: 193 LBS

## 2021-06-01 VITALS — HEIGHT: 71 IN | WEIGHT: 195 LBS | BODY MASS INDEX: 27.3 KG/M2

## 2021-06-01 VITALS — WEIGHT: 192 LBS | HEIGHT: 70 IN | BODY MASS INDEX: 27.49 KG/M2

## 2021-06-01 VITALS — HEIGHT: 71 IN | WEIGHT: 197 LBS | BODY MASS INDEX: 27.58 KG/M2

## 2021-06-01 VITALS — HEIGHT: 71 IN | BODY MASS INDEX: 26.74 KG/M2 | WEIGHT: 191 LBS

## 2021-06-01 NOTE — PROGRESS NOTES
Blaek Allen who presents today with a chief complaint of  Follow-up (ankylosis spondylitis)      Joint Pains: Yes  Location:  Hips and knees  Onset: chronic 1 year  Intensity: 5-6 /10  AM Stiffness: yes, 0  Minutes  Alleviating/Aggravating Factors: yes  Tolerating Meds: Yes  Other:      ROS:  Patient denies having any active: chest pain, shortness of breath,+ cough off and on for couple months with some foods, abdominal pain, +nausea off and on for couple month, vomiting, rashes, documented fevers, oral ulcers and recent infections.  Patient admits to having a good appetite  Information gathered by medical assistant incorporated into this note, was reviewed and discussed with the patient.    Problem List:  Patient Active Problem List   Diagnosis     Migraine Headache        PMH:   No past medical history on file.    Surgical History:  Past Surgical History:   Procedure Laterality Date     CT REMOVAL ADENOIDS,PRIMARY,<11 Y/O      Description: Adenoidectomy;  Recorded: 07/23/2008;       Family History:  Family History   Problem Relation Age of Onset     Ankylosing spondylitis Mother        Social History:   reports that he has quit smoking. He has never used smokeless tobacco.    Allergies:  No Known Allergies     Current Medications:  Current Outpatient Medications   Medication Sig Dispense Refill     cyclobenzaprine (FLEXERIL) 10 MG tablet Take 1 tablet (10 mg total) by mouth as needed. 20 tablet 0     ergocalciferol (VITAMIN D2) 50,000 unit capsule Take 1 capsule by mouth on Monday and Thursday for 4 weeks thereafter take one capsule once a week for 8 weeks. 16 capsule 0     gabapentin (NEURONTIN) 100 MG capsule 1-3 tablets po at HS as directed 90 capsule 5     ibuprofen (ADVIL,MOTRIN) 200 MG tablet Take 200 mg by mouth every 6 (six) hours as needed for pain.       meloxicam (MOBIC) 7.5 MG tablet Take 1 tablet p.o. twice daily or 2 tabs p.o. daily, prn. Take with food. 60 tablet 2     sulfaSALAzine  "(AZULFIDINE) 500 mg tablet Take 3 tablets (1,500 mg total) by mouth 2 (two) times a day. 540 tablet 0     No current facility-administered medications for this visit.            Physical Exam:  /74   Pulse 68   Ht 5' 11\" (1.803 m)   Wt 211 lb 8 oz (95.9 kg)   BMI 29.50 kg/m    General: A & O x 3 in NAD  HEENT: EOMI, Non injected/non icteric sclera, no oral lesions noted  CV: s1s2 with RRR, no rubs appreciated   Lungs: CTA B/L, no wheezing , rales or rhonci appreciated  GI: Soft, NT/ND, no rebound, no guarding noted  MS: Passive range of motion testing of hips reproduced some lateral hip pains, no focal tenderness over palpating trochanteric bursa regions.  Negative straight leg raising bilaterally.  Does have some focal discomfort over bilateral SI joint and paravertebral lumbar spine regions.  Passive flexion/extension of knees did not reproduce any pains, no clear signs of synovitis noted.  Otherwise patient demonstrated good passive/active ROM over other joints with no warmth, erythema, tenderness or synovitis noted over these joints.      Summary/Assessment:    History that includes ankylosing spondylitis.    States that restarting sulfasalazine a couple months ago has been helpful however not as beneficial as when first started.    Has not been taking meloxicam regularly.    Pains mainly involving low back, hips and knees.    Currently in the midst of taking replenishing dose for very low vitamin D level.    Latest liver enzymes within normal limits.    States has significantly cut back on alcohol beverage intake, denies drinking regularly.    No clear signs of synovitis noted.    From prior note(s): In April 2019, had moderate case of transaminitis which he states was due to having a few parties/drinking alcoholic beverages over the course of 1 week.  Adds that typically when he is on sulfasalazine he is aware avoid drinking alcoholic beverages regularly or to binge drink.    Repeat liver enzyme " "levels recently performed, scanned in our system noted to be within normal limits.    Initially, claimed to be doing \"is 99% better\" since starting sulfasalazine.      Pertinent rheumatology/past medical history (please refer to above for more detailed history):      Ankylosis spondylitis (HLA-B27 positive, positive family history/mother, abnormal imaging)    Chronic low back pain with left radiculopathy    Hx of lumbar herniated disc, lifting injury, 2016    Chronic left knee discomfort, giving out sensation    Iliotibial band tightness, bilateral    Loose stools    Acne (seen Dermatology, also component of rosacea)    Vitamin D deficiency      Rheumatology medications provided/suggested:    Sulfasalazine  Meloxicam      Pertinent medication from other providers or from otc (please refer to above for more detailed med list):    Flexeril        Pertinent medications already tried:     Neurontin (grogginess)  Advil versus Aleve  Tylenol    Pertinent lab history:      Positive HLA-B27    Noted to have negative/unremarkable: AURELIO, rheumatoid factor, ESR    Low vitamin D      Pertinent imaging/test history:    MRI of lumbar spine from March 2018 compared to MRI from October 2016 showed:  CONCLUSION:  1.  At L5-S1, there is mild degenerative disc disease, and a small shallow central disc protrusion. No associated significant central canal or lateral recess stenosis or evidence for neural impingement.  2.  No other significant abnormality in the lumbar spine.  3.  Increased marrow fat along both SI joints probably related to remote sacroiliitis.     MRI of SI joints from May 2018:  CONCLUSION:  1.  Increased marrow fat along both SI joints probably the sequela of previous, remote sacroiliitis.  2.  Trace marrow edema and enhancement along the inferior aspect of both SI joints. No other signs of active inflammation-sacroiliitis. No synovitis.    EMG study from May 2015: Left lower extremity results were " unremarkable.    X-ray of left knee was unremarkable.    Other:    Standing order for labs placed, every 8 weeks good through June 2020.    Did not pursue prior PT referral placed for: Sensations of left knee discomfort/giving out and bilateral iliotibial band tightness (admits to squatting often at work).     On prior visit, had lengthy discussion with the patient regarding avoiding alcohol beverage intake while on sulfasalazine, as combination of both can cause liver toxicity which can lead to liver failure.  Patient stated that he is able to significantly limit alcohol beverage intake as he had in the past.    Plan:      Continue sulfasalazine 1500 mg twice daily.      Recommend that he try taking meloxicam regularly and see if this benefits his residual arthralgias.    If still having significant discomfort despite take meloxicam regularly, a consideration is adding a biologic, which we discussed today.  Handout on Enbrel provided to the patient.    Can continue taking Flexeril 10 mg nightly as needed    Continue replenishing dose of vitamin D.    We will check labs in 3 weeks (while taking meloxicam regularly with sulfasalazine).    Follow-up in 2 months.      Procedure note:       This note was transcribed using Dragon voice recognition software as a result unintentional grammatical errors or word substitutions may have occurred. Please contact our Rheumatology department if you need any clarification or if you have any related inquiries.    Major side effect profile of medications provided were discussed with the patient.          Rodrigo Morfin DO....................  9/11/2019   12:14 PM

## 2021-06-01 NOTE — PATIENT INSTRUCTIONS - HE
Summary of Your Rheumatology Visit    Next Appointment:  2 Months    Medications:    Please follow directives on pill bottle on how to take medication(s) provided.      Referrals:      Tests:     Please have labs performed in about 3 weeks.       Injections:      Other:

## 2021-06-02 VITALS — WEIGHT: 208.3 LBS | BODY MASS INDEX: 29.05 KG/M2

## 2021-06-02 VITALS — WEIGHT: 201.8 LBS | BODY MASS INDEX: 28.15 KG/M2

## 2021-06-02 NOTE — TELEPHONE ENCOUNTER
----- Message from Rodrigo Morfin DO sent at 10/13/2019  8:36 AM CDT -----  Vitamin D level now within normal limits.  After completing replenishing dose of vitamin D, recommend taking over the winter months vitamin D 1000 units over-the-counter daily.    Normal CBC/cell count, creatinine/kidney function and liver enzymes.

## 2021-06-03 VITALS
WEIGHT: 211 LBS | DIASTOLIC BLOOD PRESSURE: 86 MMHG | HEIGHT: 71 IN | HEART RATE: 64 BPM | BODY MASS INDEX: 29.54 KG/M2 | SYSTOLIC BLOOD PRESSURE: 126 MMHG

## 2021-06-03 VITALS
HEIGHT: 71 IN | WEIGHT: 211.5 LBS | HEART RATE: 68 BPM | SYSTOLIC BLOOD PRESSURE: 110 MMHG | DIASTOLIC BLOOD PRESSURE: 74 MMHG | BODY MASS INDEX: 29.61 KG/M2

## 2021-06-04 NOTE — PATIENT INSTRUCTIONS - HE
Summary of Your Rheumatology Visit    Next Appointment:  4 Months    Medications:    Continue medications provided, as discussed.    Referrals:      Tests:     Please have labs performed in 4 weeks and every 12 weeks thereafter.    Injections:        Other:

## 2021-06-04 NOTE — PROGRESS NOTES
Blake Allen who presents today with a chief complaint of  Follow-up      Joint Pains: Yes  Location: lower back   Onset: chronic 2 years   Intensity: 3 /10  AM Stiffness: no  Alleviating/Aggravating Factors: yes Medications helpful  Tolerating Meds: Yes tolerate med.   Other:      ROS:  Patient denies having any active: chest pain, shortness of breath, cough, abdominal pain, nausea, vomiting, rashes, documented fevers, oral ulcers and recent infections. +Patient admits to having a good appetite.  Information gathered by medical assistant incorporated into this note, was reviewed and discussed with the patient.    Problem List:  Patient Active Problem List   Diagnosis     Migraine Headache        PMH:   No past medical history on file.    Surgical History:  Past Surgical History:   Procedure Laterality Date     TN REMOVAL ADENOIDS,PRIMARY,<13 Y/O      Description: Adenoidectomy;  Recorded: 07/23/2008;       Family History:  Family History   Problem Relation Age of Onset     Ankylosing spondylitis Mother        Social History:   reports that he has quit smoking. He has never used smokeless tobacco.    Allergies:  No Known Allergies     Current Medications:  Current Outpatient Medications   Medication Sig Dispense Refill     cyclobenzaprine (FLEXERIL) 10 MG tablet Take 1 tablet (10 mg total) by mouth as needed. 20 tablet 0     ibuprofen (ADVIL,MOTRIN) 200 MG tablet Take 200 mg by mouth every 6 (six) hours as needed for pain.       meloxicam (MOBIC) 7.5 MG tablet Take 1 tablet p.o. twice daily or 2 tabs p.o. daily, prn. Take with food. 180 tablet 1     ergocalciferol (VITAMIN D2) 50,000 unit capsule Take 1 capsule by mouth on Monday and Thursday for 4 weeks thereafter take one capsule once a week for 8 weeks. 16 capsule 0     gabapentin (NEURONTIN) 100 MG capsule 1-3 tablets po at HS as directed 90 capsule 5     sulfaSALAzine (AZULFIDINE) 500 mg tablet Take 3 tablets (1,500 mg total) by mouth 2 (two) times a  "day. 540 tablet 0     No current facility-administered medications for this visit.            Physical Exam:  /86, Pulse 64, Weight 211, Height 5' 11\"  General: A & O x 3 in NAD  HEENT: EOMI, Non injected/non icteric sclera, no oral lesions noted  CV: s1s2 with RRR, no rubs appreciated   Lungs: CTA B/L, no wheezing , rales or rhonci appreciated  GI: Soft, NT/ND, no rebound, no guarding noted  MS: Negative straight leg raising bilaterally, palpating vertebral/paravertebral spine regions did not reproduce any pains.  Otherwise patient demonstrated good passive/active ROM over other joints with no warmth, erythema, tenderness or synovitis noted over these joints.      Summary/Assessment:    History that includes ankylosing spondylitis.    States that his low back pains have been more stable lately with taking sulfasalazine 1500 mg twice a day and meloxicam 7.5 mg twice a day.    Currently not taking vitamin D.    Labs from about 2-3 months ago noted to be stable.      No clear signs of synovitis noted.    From prior note(s): In April 2019, had moderate case of transaminitis which he states was due to having a few parties/drinking alcoholic beverages over the course of 1 week.  Adds that typically when he is on sulfasalazine he is aware avoid drinking alcoholic beverages regularly or to binge drink.    Repeat liver enzyme levels recently performed, scanned in our system noted to be within normal limits.    Initially, claimed to be doing \"is 99% better\" since starting sulfasalazine.      Pertinent rheumatology/past medical history (please refer to above for more detailed history):      Ankylosis spondylitis (HLA-B27 positive, positive family history/mother, abnormal imaging)    Chronic low back pain with left radiculopathy    Hx of lumbar herniated disc, lifting injury, 2016    Chronic left knee discomfort, giving out sensation    Iliotibial band tightness, bilateral    Loose stools    Acne (seen Dermatology, also " component of rosacea)    Vitamin D deficiency    History elevated liver enzymes, improved with avoiding alcohol      Rheumatology medications provided/suggested:    Sulfasalazine  Meloxicam      Pertinent medication from other providers or from otc (please refer to above for more detailed med list):    Flexeril        Pertinent medications already tried:     Neurontin (grogginess)  Advil versus Aleve  Tylenol    Pertinent lab history:      Positive HLA-B27    Noted to have negative/unremarkable: AURELIO, rheumatoid factor, ESR    Low vitamin D      Pertinent imaging/test history:    MRI of lumbar spine from March 2018 compared to MRI from October 2016 showed:  CONCLUSION:  1.  At L5-S1, there is mild degenerative disc disease, and a small shallow central disc protrusion. No associated significant central canal or lateral recess stenosis or evidence for neural impingement.  2.  No other significant abnormality in the lumbar spine.  3.  Increased marrow fat along both SI joints probably related to remote sacroiliitis.     MRI of SI joints from May 2018:  CONCLUSION:  1.  Increased marrow fat along both SI joints probably the sequela of previous, remote sacroiliitis.  2.  Trace marrow edema and enhancement along the inferior aspect of both SI joints. No other signs of active inflammation-sacroiliitis. No synovitis.    EMG study from May 2015: Left lower extremity results were unremarkable.    X-ray of left knee was unremarkable.    Other:    Standing order for labs placed, every 8 weeks good through June 2020.    Did not pursue prior PT referral placed for: Sensations of left knee discomfort/giving out and bilateral iliotibial band tightness (admits to squatting often at work).     On prior visit, had lengthy discussion with the patient regarding avoiding alcohol beverage intake while on sulfasalazine, as combination of both can cause liver toxicity which can lead to liver failure.  Patient stated that he is able to  significantly limit alcohol beverage intake as he had in the past.    Plan:      Continue sulfasalazine 1500 mg twice daily.      Continue meloxicam 7.5 mg twice daily, PRN.  Made aware that if feeling better he can try cutting back to once a day.    Can continue taking Flexeril 10 mg nightly as needed    Suggested taking 1000 units of vitamin D daily.  OTC.    Check labs in about 2 weeks and 12 weeks thereafter.      Follow-up in 4 months.      Procedure note:       This note was transcribed using Dragon voice recognition software as a result unintentional grammatical errors or word substitutions may have occurred. Please contact our Rheumatology department if you need any clarification or if you have any related inquiries.        Rodrigo Morfin DO ....................  12/18/2019   2:54 PM

## 2021-06-05 NOTE — TELEPHONE ENCOUNTER
----- Message from Rodrigo Morfin DO sent at 1/12/2020 11:20 PM CST -----  Mildly elevated ALT level (liver enzyme), not new, current level not worrisome. Recommending decreasing intake of Meloxicam avoiding alcoholic beverages, Recheck ALT/AST in about 4 weeks    Otherwise remainder of lab results were unremarkable.

## 2021-06-05 NOTE — TELEPHONE ENCOUNTER
Request refill received for sulfasalazine from [facility] pharmacy.     Last OV 12/18/19  Last lab 1/2/20  Future appt 5/27/20    rx sent for signature.

## 2021-06-08 NOTE — TELEPHONE ENCOUNTER
4:45 finally got a hold of the pt after multple calls. Patient states he want to cancel the appt he do not have time for the video visit and want to cancel the appointment. Note attempt  5/20/20 4:13, 4:33 and 4:40 x3 no answer LVM x2 5/13/20 9:52 lvm to change visit to video  x1 follow up  
[facility]

## 2021-06-09 NOTE — PROGRESS NOTES
Chief complaint: Follow-up back pain    HPI: The patient has a discrete workers comp injury in August 2016 and went through therapy and close out that case couple of weeks later.  He had another injury on 22 October that we thought might be related to his previous back injury because he was at work and he was leaning over and tweaked his back.    His mother sees a rheumatologist because she has HLA-B27 positive testing consistent with ankylosing spondylitis.  He went to see a rheumatologist who thought that this might be consistent with ankylosing spondylitis because he has HLA-B27 positive but he is too young to formally make a diagnosis at this time.  He has tried Flexeril for 3 months didn't see a change in his back.    At the end of November he went to see a chiropractor and they did some nerve testing in a month later they did some other nerve testing and is getting treatments 3 times a week he had some x-rays that showed the loss of cervical lordosis and possibly a disc compression at L5-S1.  He feels better after the treatments and the chiropractor has given him exercises to do to work on his core strength.  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  He is not currently working but will likely look for something in the summer.    He reports that since since he has started having some neck manipulations with the chiropractor he has gotten an ophthalmic migraine but may be related to the car prior to manipulations.    Ultimately it was discussed determined that the injury from October was not work comp related.  We will call the work comp carrier  to try to work through what we need to do to close the case since it was not determined to be a work comp case anyway.    Objective:  Visit Vitals     /76 (Patient Site: Right Arm, Patient Position:  Sitting, Cuff Size: Adult Regular)     Pulse 64     Wt 193 lb 6.4 oz (87.7 kg)     BMI 27.75 kg/m2     He is in no distress and he moves about the room easily.  His deep tendon reflexes are symmetric and straight leg raises negative but he has some tight hamstrings so we talked about the importance of flexibility and stretching hamstrings and working on his core strength and he feels much better when he does that.  He has a little bit of trapezius muscle spasm but is not nearly as bad as it used to be.    Assessment: Low back pain with some muscle spasm and possible anatomic issues related to his genetics    Plan: We'll have him continue with the chiropractor and the exercises.  If he needs formal physical therapy we will order that.  We'll try to close out the case from a work comp standpoint and we'll see him back after he completes his chiropractic plan of care that was projected to be for about 6 months.

## 2021-06-09 NOTE — PROGRESS NOTES
Chief complaint: Persistent sciatic nerve pain    HPI: Patient has had some problems with low back pain and he has been seeing a chiropractor who is been helping.  They think that maybe he has a degenerated disc at L5 or S1.  He also has HLA-B27 positivity but he does not meet the criteria for ankylosing spondylitis.    The chiropractor has been working on the sciatic nerve pain on the left and just does not seem to make any headway so another ready for next steps.    Objective:/70 (Patient Site: Right Arm, Patient Position: Sitting, Cuff Size: Adult Regular)  Pulse 64  Wt 192 lb (87.1 kg)  BMI 27.55 kg/m2  He is in no acute distress.  When he is sitting on the exam table and straightens out his left leg, dorsiflexes his toe and flexes his chin he does have pulled the pain near the sciatic nerve.  The same thing happened on the right side but that side does not bother him nearly as much.    He also has some sort of a click when he is flexing and externally rotating his left leg at the hip and the iliac crest.    Assessment: Persistent sciatic nerve pain with low back pain    Plan: We will start with some gabapentin 1-300 mg at night to start with.  We will send him to a different physical therapy location so they can work on some myofascial issues and I will do an MRI of the lumbar spine with cuts through the sciatic nerve to make sure there is not something else mechanical that we can fix or treat they are comfortable with that to start with and will pursue further workup as we need to based on the test results

## 2021-06-16 NOTE — PROGRESS NOTES
ASSESSMENT:  1. Low back pain  Patient with a long-standing history of intermittent recurrent low back pain now with left radicular symptoms.  There is a family history of ankylosing spondylitis, the patient has a positive HLA-B27, and questionable areas of inflammation noted on prior MRI of the lumbar spine.  The patient of course could simply have lumbar degenerative disc disease, but certainly raises the possibility of underlying inflammation as a cause of low back pain.    - Ambulatory referral to Rheumatology  - MR Lumbar Spine Without Contrast; Future      PLAN:  1.  Symptomatic treatment with prednisone 60 mg a day for 5 days.  2.  I renewed Flexeril 10 mg up to 3 times a day as needed, patient is aware of the potential for drowsiness and sedation.  3.  MRI lumbar spine  4.  Rheumatology referral, to see if we can get a more clear assessment of whether not the patient has an underlying inflammatory condition.  5.  I gave the patient a note with work restrictions.  These of course can be modified changed or amended as needed and as the patient improves.  6.  Adacel and influenza vaccines.  7.  Follow-up as needed for back pain or other issues.    Orders Placed This Encounter   Procedures     MR Lumbar Spine Without Contrast     Standing Status:   Future     Standing Expiration Date:   2/20/2019     Order Specific Question:   Provider callback number for results (for HOLD and CALL only):     Answer:   history of low back pain, SI joint inflammationand also left radicular symptoms     Order Specific Question:   Can the procedure be changed per Radiologist protocol?     Answer:   Yes     Order Specific Question:   What is the patient's sedation requirement?     Answer:   No Sedation     Order Specific Question:   If this is a diagnostic procedure, have the patient's age and recent imaging history been considered?     Answer:   Yes     Tdap vaccine,  6yo or older,  IM     Influenza, Seasonal,Quad Inj, 36+ MOS      Ambulatory referral to Rheumatology     Referral Priority:   Routine     Referral Type:   Consultation     Referral Reason:   Evaluation and Treatment     Requested Specialty:   Rheumatology     Number of Visits Requested:   1     Medications Discontinued During This Encounter   Medication Reason     cyclobenzaprine (FLEXERIL) 10 MG tablet Reorder       No Follow-up on file.    CHIEF COMPLAINT:  Chief Complaint   Patient presents with     Back Pain     lower back pain - sees spine- having some leg and back pain-numbness in his foot/leg left side NEEDS: tdap, flu vac        SUBJECTIVE:  Blake is a 22 y.o. male presenting to the clinic with back pain. He is accompanied by his mother today.      Back Pain: He has been experiencing lower back pain for roughly the past year and a half. He notes that he will go weeks at a time without noticing the pain, but then for months he will have crippling lower back pain. Simple tasks such as rolling over or getting out of bed become incredibly difficult. Occasionally the pain will radiate through the left hip and down the entirety of the left leg. In the last couple of weeks, he has started to have pain and numbness in his left foot which he has never experienced before. In the past, he has seen a chiropractor, a physical therapist, and received injections in the lower back. His mother states that she has ankylosing spondylitis and is worried that he has the beginnings stages of the condition. They have been told that he has the early markers of ankylosing spondylitis. He engages in heavy lifting at his job. He has been taking Advil prior to going to work which can get him through the shift. An MRI from 4/14/17 revealed that he had a herniated disc at L5-S1. He has taken gabapentin in the past which subdued nerve pain but compromised his ability to stay alert. Flexeril, however, has provided some relief in the past.    Health Maintenance: His last tetanus shot was over ten years  ago; he agrees to receive a booster today. He also agrees to receive the influenza vaccine.     REVIEW OF SYSTEMS:   All other systems are negative.    [facility]:  Immunization History   Administered Date(s) Administered     DTP 03/24/2000     DTaP / HiB 1995, 1995, 1995, 04/25/1996     DTaP, historic 03/24/2000     Hep B, Adult 1995, 1995     Hep B, Peds or Adolescent 1995     Hep B, historic 1995, 1995, 1995     Influenza, Live, Nasal LAIV3 10/07/2008     Influenza, Seasonal, Inj PF IIV3 12/23/2010     Influenza, nasal, historic 10/07/2008     MMR 04/25/1996, 03/24/2000     OPV,Trivalent,Historic([id number] only) 1995, 1995, 1995, 03/24/2000     Tdap 06/19/2007     Social History     Social History     Marital status: Single     Spouse name: N/A     Number of children: N/A     Years of education: N/A     Occupational History     Not on file.     Social History Main Topics     Smoking status: Former Smoker     Smokeless tobacco: Not on file      Comment: quit 3 months ago     Alcohol use Not on file     Drug use: Not on file     Sexual activity: Not on file     Other Topics Concern     Not on file     Social History Narrative     MEDICATIONS:  Current Outpatient Prescriptions   Medication Sig Dispense Refill     cyclobenzaprine (FLEXERIL) 10 MG tablet Take 1 tablet (10 mg total) by mouth as needed. 20 tablet 0     gabapentin (NEURONTIN) 100 MG capsule 1-3 tablets po at HS as directed 90 capsule 5     ibuprofen (ADVIL,MOTRIN) 200 MG tablet Take 200 mg by mouth every 6 (six) hours as needed for pain.       predniSONE (DELTASONE) 20 MG tablet Take three pills daily for five days 15 tablet 0     No current facility-administered medications for this visit.        TOBACCO USE:  History   Smoking Status     Former Smoker   Smokeless Tobacco     Not on file     Comment: quit 3 months ago       VITALS:  Vitals:    02/20/18 1331   BP: 110/62   Pulse: 70    Weight: 193 lb (87.5 kg)     Wt Readings from Last 3 Encounters:   02/20/18 193 lb (87.5 kg)   04/06/17 192 lb (87.1 kg)   02/24/17 193 lb 6.4 oz (87.7 kg)       PHYSICAL EXAM:  Constitutional:   Reveals a pleasant male that appears stated age.  Vitals: per nursing notes.  Musculoskeletal: Mild diffuse low back pain. Negative straight leg raising test, brisk reflexes bilaterally. Plantar dorsiflexion 5+/5 strength.   Neuro:  Alert and oriented. Cranial nerves, motor, sensory exams are intact.  No gross focal deficits.  Psychiatric:  Memory intact, mood appropriate.    DATA REVIEWED:  Additional History from Old Records Summarized (2): Reviewed progress note 4/6/17; back pain.   Decision to Obtain Records (1): None.   Radiology Tests Summarized or Ordered (1): Reviewed MRI lumbar spine 4/14/17; 2 millimeter disc herniation L5-S1. Ordered MRI lumbar spine.   Labs Reviewed or Ordered (1): Reviewed labs 10/24/16; HLA-B27 Antigen.   Medicine Test Summarized or Ordered (1): None.   Independent Review of EKG, X-RAY, or RAPID STREP (2 each): None.     The visit lasted a total of 23 minutes face to face with the patient. Over 50% of the time was spent counseling and educating the patient about back pain.    I, Bonifacio Ramos, am scribing for and in the presence of, Dr. Rodriguez.    I, Dr. Rodriguez, personally performed the services described in this documentation, as scribed by Bonifacio Ramos in my presence, and it is both accurate and complete.    Total data points: 4

## 2021-06-17 NOTE — PROGRESS NOTES
Blake Allen who presents today with a chief complaint of  Consult, low back pain.      Joint Pains: yes  Location: low back, left leg, left knee  Onset: 2016, left knee 2-3 months  Intensity: 2-3 /10  AM Stiffness: 15 Minutes  Alleviating/Aggravating Factors: Muscle relaxers, neurontin, and stretching helpful transiently.  Tolerating Meds: Neurontin makes groggy  Other: no injury to knee, lifting injury to low back initially, different than current pans.        ROS:  Patient denies having any dry eyes, +dry mouth, no: oral ulcers, alopecia, rashes, photosensitivity, history of psoriasis, chest pain, shortness of breath, cough, dysuria, history of kidney stones, abdominal pain, diarrhea (however has some loose stools x 5 months), no: hematochezia, dysphagia, history of peptic ulcer disease, history of HIV, tuberculosis, hepatitis B or C, Lyme disease, seizure history, raynaud's, fevers, recent infections, difficulty sleeping, involuntary weight loss, +low back stiffness x 15 min, no: weakness (except l knee at times), no: fatigue, depression, anxiety, loss of appetite, + numbness and tingling in left distal le and left foot q2-3 months improved with rest, no: headaches, recurrent sinusitis, , double vision, loss of vision or painful vision.      Problem List:  Patient Active Problem List   Diagnosis     Migraine Headache        PMH:   No past medical history on file.    Surgical History:  Past Surgical History:   Procedure Laterality Date     [place] REMOVAL ADENOIDS,PRIMARY,<13 Y/O      Description: Adenoidectomy;  Recorded: 07/23/2008;       Family History:  Family History   Problem Relation Age of Onset     Ankylosing spondylitis Mother        Social History:   reports that he has quit smoking. He has never used smokeless tobacco.    Allergies:  No Known Allergies     Current Medications:  Current Outpatient Prescriptions   Medication Sig Dispense Refill     cyclobenzaprine (FLEXERIL) 10 MG tablet Take 1  "tablet (10 mg total) by mouth as needed. 20 tablet 0     gabapentin (NEURONTIN) 100 MG capsule 1-3 tablets po at HS as directed 90 capsule 5     ibuprofen (ADVIL,MOTRIN) 200 MG tablet Take 200 mg by mouth every 6 (six) hours as needed for pain.       No current facility-administered medications for this visit.            Physical Exam:  /82  Pulse 85  Ht 5' 10\" (1.778 m)  Wt 192 lb (87.1 kg)  BMI 27.55 kg/m2  General: A & O x 3 in NAD  HEENT: EOMI, Non injected/non icteric sclera, no oral lesions noted  Neck: Supple, no cervical LAD or thyromegaly noted  Derm: Positive facial/malar acne-like skin lesions, psoriatic lesions or nail pitting appreciated  CV: s1s2 with RRR, no rubs appreciated   Lungs: CTA B/L, no wheezing , rales or rhonci appreciated  GI: Soft, NT/ND, no rebound, no guarding noted, no hepatomegally appreciated  MS: Passive range of motion testing of hips reproduce some mild tightness discomfort involving left lateral thighs with some focal discomfort on palpation.  Passive flexion/extension of left knee did not reproduce any pains, no synovitis noted.  Otherwise patient demonstrated good passive/active ROM over other joints with no warmth, erythema, tenderness or synovitis noted over these joints.  Back: Straight leg raising bilaterally did not reproduce any back pains.  Has some focal discomfort on palpating left lumbar spine region.  Neuro: 5/5 strength in upper and lower extremities b/l, decreased sensation involving dorsum of left foot compared to right.  2+ bilateral bicep and right patella reflexes, left patella hyperreflexic.      Summary/Assessment:    23-year-old male presents with chronic low back pains and occasional left lower extremity discomfort/paresthesias.    Has over the past 2 3 months has been experiencing some left knee discomfort that comes and goes with occasional \"giving out sensation\"'    Has history of low back trauma in 2016 after lifting a greater than 150 pound " "tire at work.  States had MRI and diagnosed with herniated disc.  He thereafter pursued PT and chiropractic treatments along with not working for about 6 months.  This resulted in some improvement however he never had complete resolution of his symptoms.    Since then he has been experiencing current \"'different type of back pain\"'.  He is still lifting items with different type of work and lateral load of no greater than 50 pounds.  Has some discomfort related to work however typically towards the end of the day.      Takes 600 mg of ibuprofen or 2 tablets of Aleve in the morning which provides partial benefit.  At night takes Flexeril as needed with some benefit as well.    Stretches helpful, Neurontin at night although helpful even at low-dose contributes to morning grogginess.    Adds that his mother was diagnosed with AS, he has positive genetic marker however was not diagnosed with AS.    He has seen Dr. Mccabe a rheumatologist once before about a year ago, was treated with Naprosyn.  Has not followed up with rheumatology since then.    Pertinent rheumatology/past medical history (please refer to above for more detailed history):      Chronic low back pain with left radiculopathy    Hx of lumbar herniated disc, lifting injury, 2016    HLA-B27 positive    Family history of ankylosis spondylitis (mother)    Chronic left knee discomfort, giving out sensation    Iliotibial band tightness, bilateral    Loose stools    Acne    Vitamin D deficiency    Regular alcohol beverage intake (about 5 beers/week)      Rheumatology medications provided/suggested:    Tylenol      Pertinent medication from other providers or from otc (please refer to above for more detailed med list):    Flexeril  Advil versus Aleve      Pertinent medications already tried:     Neurontin (grogginess)      Pertinent lab history:      Positive HLA-B27    Noted to have negative/unremarkable: AURELIO, rheumatoid factor, ESR    Low vitamin " D      Pertinent imaging/test history:    MRI of lumbar spine from March 2018 compared to MRI from October 2016 showed:  CONCLUSION:  1.  At L5-S1, there is mild degenerative disc disease, and a small shallow central disc protrusion. No associated significant central canal or lateral recess stenosis or evidence for neural impingement.  2.  No other significant abnormality in the lumbar spine.  3.  Increased marrow fat along both SI joints probably related to remote sacroiliitis.       Other:        Plan:      We will obtain MRI of SI joints to further evaluate abnormal SI joint findings on lumbar MRI.    We will obtain EMG study to further evaluate left lower extremity radiculopathy/paresthesias/hyperreflexia.  Hopefully between MRI and EMG study will help differentiate if current back pain/radiculopathy is inflammatory versus mechanical versus both.    Given loose stools with HLA B27 positive will refer to gastroenterology.    We will refer to PT for sensations of left knee discomfort/giving out and bilateral iliotibial band tightness (admits to squatting often at work).      We will x-ray left knee.    We will refer to dermatology for facial/malar acne skin lesions.    Can continue taking Flexeril 10 mg nightly as needed    Suggested trying Tylenol instead of Aleve or Advil given loose stools and see if these symptoms improve.    We will obtain some labs today correlate clinically.    Follow-up in 2 months.      Procedure note:       Spent 70 minutes with greater than half of this time spent with the patient going over differential diagnosis, prognosis, treatment plan, medication side effects and  answering questions.    Side effect profile of medications provided/suggested were discussed with the patient.    This note was transcribed using Dragon voice recognition software as a result unintentional grammatical errors or word substitutions may have occurred. Please contact our Rheumatology department if you need  any clarification or if you have any related inquiries.    Thank you for referring this patient to our clinic.      Rodrigo Morfin ....................  4/26/2018   10:47 AM

## 2021-06-18 NOTE — PROGRESS NOTES
Patient presents at the request of Dr. Pittman for left lower extremity EMG.  He has 2 year history of low back pain with left gluteal lower extremity pain weakness numbness and tingling down left leg to the bottom of the foot.  Increased over the past 6 months.    EMG/NCS  results: Please see scanned full report.    Comment NCS: Normal study  1.  Normal nerve conduction studies left lower extremity.    Comment EMG: Normal study  1.  Normal needle EMG left lower extremity.    Interpretation: Normal study    1. There is no electrodiagnostic evidence of lumbosacral radiculopathy, lumbosacral plexopathy, or focal neuropathy in the left lower extremity.    The testing was completed in its entirety by the physician.      It was our pleasure caring for your patient today, if there any questions or concerns please do not hesitate to contact us.

## 2021-06-18 NOTE — PROGRESS NOTES
"Blake Allen who presents today with a chief complaint of  Follow-up, back pain.    Joint Pains: yes  Location: low back  Onset: chronic  Intensity:  2-8/10  AM Stiffness:  10 Minutes  Alleviating/Aggravating Factors: Activity can exacerbate pains.  Tolerating Meds: yes  Other: loose stools have improved.      ROS:  Patient denies having any chest pain, shortness of breath, cough, abdominal pain, nausea, vomiting, rashes, fevers, oral ulcers and recent infections.  Patient admits to having a good appetite      Problem List:  Patient Active Problem List   Diagnosis     Migraine Headache        PMH:   No past medical history on file.    Surgical History:  Past Surgical History:   Procedure Laterality Date     [place] REMOVAL ADENOIDS,PRIMARY,<11 Y/O      Description: Adenoidectomy;  Recorded: 07/23/2008;       Family History:  Family History   Problem Relation Age of Onset     Ankylosing spondylitis Mother        Social History:   reports that he has quit smoking. He has never used smokeless tobacco.    Allergies:  No Known Allergies     Current Medications:  Current Outpatient Prescriptions   Medication Sig Dispense Refill     cyclobenzaprine (FLEXERIL) 10 MG tablet Take 1 tablet (10 mg total) by mouth as needed. 20 tablet 0     ergocalciferol (VITAMIN D2) 50,000 unit capsule One capsule Mon and Thurs for 4 weeks. After 4 weeks take once a week for 8 weeks. 16 capsule 0     gabapentin (NEURONTIN) 100 MG capsule 1-3 tablets po at HS as directed 90 capsule 5     ibuprofen (ADVIL,MOTRIN) 200 MG tablet Take 200 mg by mouth every 6 (six) hours as needed for pain.       No current facility-administered medications for this visit.            Physical Exam:  /82  Pulse 85  Resp 10  Ht 5' 11\" (1.803 m)  Wt 197 lb (89.4 kg)  BMI 27.48 kg/m2  General: A & O x 3 in NAD  HEENT: EOMI, Non injected/non icteric sclera, no oral lesions noted  CV: s1s2 with RRR, no rubs appreciated   Lungs: CTA B/L, no wheezing , rales " "or rhonci appreciated  GI: Soft, NT/ND, no rebound, no guarding noted  MS: Positive discomfort involving low vertebral spine and SI joints on palpation.  Negative straight leg raising bilaterally.  Otherwise patient demonstrated good passive/active ROM over other joints with no warmth, erythema, tenderness or synovitis noted over these joints.      Summary/Assessment:      History that includes chronic low back pains, likely has component of ankylosing spondylitis contributing given MRI of SI joint findings consistent with having some signs of sacroiliitis along with positive HLA-B27 level and family history (mother) of having AS.    EMG study of left lower extremity was unremarkable.      From prior note:  Has history of low back trauma in 2016 after lifting a greater than 150 pound tire at work.  States had MRI and diagnosed with herniated disc.  He thereafter pursued PT and chiropractic treatments along with not working for about 6 months.  This resulted in some improvement however he never had complete resolution of his symptoms.    Since then he has been experiencing current \"'different type of back pain\"'.  He is still lifting items with different type of work and lateral load of no greater than 50 pounds.  Has some discomfort related to work however typically towards the end of the day.      He has seen Dr. Mccabe a rheumatologist once before about a year ago, was treated with Naprosyn.  Has not followed up with rheumatology since then.    Pertinent rheumatology/past medical history (please refer to above for more detailed history):      Chronic low back pain with left radiculopathy    Hx of lumbar herniated disc, lifting injury, 2016    HLA-B27 positive    Family history of ankylosis spondylitis (mother)    Chronic left knee discomfort, giving out sensation    Iliotibial band tightness, bilateral    Loose stools    Acne (referred to dermatology, some improvement)    Vitamin D deficiency    Regular alcohol " beverage intake (about 5 beers/week)      Rheumatology medications provided/suggested:    Tylenol  Sulfasalazine  Meloxicam      Pertinent medication from other providers or from otc (please refer to above for more detailed med list):    Flexeril        Pertinent medications already tried:     Neurontin (grogginess)  Advil versus Aleve    Pertinent lab history:      Positive HLA-B27    Noted to have negative/unremarkable: AURELIO, rheumatoid factor, ESR    Low vitamin D      Pertinent imaging/test history:    MRI of lumbar spine from March 2018 compared to MRI from October 2016 showed:  CONCLUSION:  1.  At L5-S1, there is mild degenerative disc disease, and a small shallow central disc protrusion. No associated significant central canal or lateral recess stenosis or evidence for neural impingement.  2.  No other significant abnormality in the lumbar spine.  3.  Increased marrow fat along both SI joints probably related to remote sacroiliitis.     MRI of SI joints from May 2018:  CONCLUSION:  1.  Increased marrow fat along both SI joints probably the sequela of previous, remote sacroiliitis.  2.  Trace marrow edema and enhancement along the inferior aspect of both SI joints. No other signs of active inflammation-sacroiliitis. No synovitis.    EMG study from May 2015: Left lower extremity results were unremarkable.    X-ray of left knee was unremarkable.    Other:    Standing order for labs placed good through June 2019.    Plan:      For ankylosis mellitus, will initiate sulfasalazine starting at 500 mg twice a day increasing weekly by 500 mg up to maintenance dose of 1500 mg twice a day.  Side effect profile discussed at length with the patient.  Patient is currently single and does not plan on having any children in the near future.  He was made aware that if his plans for children change, he is to contact us to modify for alternative drug, as sulfasalazine can contribute to reversible oligospermia.    Suggested he cut  back on alcohol beverage intake currently has about 5 beers a week suggested he try to discontinue altogether or limit to, taking sparingly as combination of alcohol beverage intake along with sulfasalazine use can increase risk for liver toxicity.    Will provide meloxicam for breakthrough pains.  Hold Aleve and/or ibuprofen.  If loose stools resurface on meloxicam, should discontinue.    States his loose stools have improved, has seen a GI and per patient no urgency in having colonoscopy.  Hence, for now we can just monitor if loose stools resurface and persistent and he should follow-up with GI to have colonoscopy performed.    Patient followed through with PT consult  however did not continue with follow-up visits, plans on doing so.  Referral was placed for: Sensations of left knee discomfort/giving out and bilateral iliotibial band tightness (admits to squatting often at work).      Can continue taking Flexeril 10 mg nightly as needed    We will obtain some labs today, in the 4-5 weeks and 8 weeks thereafter. Thereafter if stable will check every 12 weeks.      We will obtain baseline chest x-ray.    Follow-up in 3 months.      Procedure note:         Spent 40 minutes with greater than half of this time spent with the patient going over differential diagnosis, prognosis, treatment plan, medication side effects and  answering questions.    This note was transcribed using Dragon voice recognition software as a result unintentional grammatical errors or word substitutions may have occurred. Please contact our Rheumatology department if you need any clarification or if you have any related inquiries.    Major side effect profile of medications provided were discussed with the patient.      Rodrigo Morfin ....................  6/20/2018   11:48 AM

## 2021-06-19 NOTE — LETTER
Letter by Celeste Churchill MD at      Author: Celeste Churchill MD Service: -- Author Type: --    Filed:  Encounter Date: 4/26/2019 Status: (Other)         [place] [address]             April 26, 2019         Dear Mr. Allen,    Below are the results from your recent visit:    Resulted Orders   Comprehensive Metabolic Panel   Result Value Ref Range    Sodium 139 136 - 145 mmol/L    Potassium 4.1 3.5 - 5.0 mmol/L    Chloride 102 98 - 107 mmol/L    CO2 28 22 - 31 mmol/L    Anion Gap, Calculation 9 5 - 18 mmol/L    Glucose 97 70 - 125 mg/dL    BUN 17 8 - 22 mg/dL    Creatinine 1.08 0.70 - 1.30 mg/dL    GFR MDRD Af Amer >60 >60 mL/min/1.73m2    GFR MDRD Non Af Amer >60 >60 mL/min/1.73m2    Bilirubin, Total 0.8 0.0 - 1.0 mg/dL    Calcium 10.1 8.5 - 10.5 mg/dL    Protein, Total 7.1 6.0 - 8.0 g/dL    Albumin 4.3 3.5 - 5.0 g/dL    Alkaline Phosphatase 57 45 - 120 U/L    AST 46 (H) 0 - 40 U/L    ALT 98 (H) 0 - 45 U/L    Narrative    Fasting Glucose reference range is 70-99 mg/dL per  American Diabetes Association (ADA) guidelines.   Hepatitis Acute Evaluation   Result Value Ref Range    Hepatitis A Antibody, IgM Negative Negative    Hepatitis B Core Leena, IgM Negative Negative    Hepatitis B Surface Ag Negative Negative    Hepatitis C Ab Negative Negative   GGT (Gamma GT)   Result Value Ref Range    GGT (Gamma GT) 37 0 - 50 U/L   Mitochondrial M2 Antibody, IgG   Result Value Ref Range    Mitochondrial M2 Leena IgG <1 <4 U/mL      Comment:      Negative    Performed and/or entered by:  [address] [address]    Lipid Cascade   Result Value Ref Range    Cholesterol 208 (H) <=199 mg/dL    Triglycerides 212 (H) <=149 mg/dL    HDL Cholesterol 38 (L) >=40 mg/dL    LDL Calculated 128 <=129 mg/dL    Patient Fasting > 8hrs? Yes        The cholesterol is a little high; if you cut back on the alcohol a bit, that would help    I just want  you to repeat the blood tests next month at [facility] and we will keep an eye on the liver enzymes    Please call with questions or contact us using MyChart.    Sincerely,        Electronically signed by Celeste Churchill MD

## 2021-06-20 NOTE — PROGRESS NOTES
Blake Allen who presents today with a chief complaint of  AS    Joint Pains: yes  Location: low back  Onset: chronic  Intensity:  3/10  AM Stiffness:  5 Minutes  Alleviating/Aggravating Factors: Medications have been helpful.  Tolerating Meds: yes  Other:      ROS:  Patient denies having any chest pain, shortness of breath, + allergy related cough, no: abdominal pain, nausea, vomiting, rashes, fevers, oral ulcers and recent infections.  Patient admits to having a good appetite      Problem List:  Patient Active Problem List   Diagnosis     Migraine Headache        PMH:   No past medical history on file.    Surgical History:  Past Surgical History:   Procedure Laterality Date     SC REMOVAL ADENOIDS,PRIMARY,<11 Y/O      Description: Adenoidectomy;  Recorded: 07/23/2008;       Family History:  Family History   Problem Relation Age of Onset     Ankylosing spondylitis Mother        Social History:   reports that he has quit smoking. He has never used smokeless tobacco.    Allergies:  No Known Allergies     Current Medications:  Current Outpatient Prescriptions   Medication Sig Dispense Refill     cyclobenzaprine (FLEXERIL) 10 MG tablet Take 1 tablet (10 mg total) by mouth as needed. 20 tablet 0     ergocalciferol (VITAMIN D2) 50,000 unit capsule One capsule Mon and Thurs for 4 weeks. After 4 weeks take once a week for 8 weeks. 16 capsule 0     gabapentin (NEURONTIN) 100 MG capsule 1-3 tablets po at HS as directed 90 capsule 5     ibuprofen (ADVIL,MOTRIN) 200 MG tablet Take 200 mg by mouth every 6 (six) hours as needed for pain.       meloxicam (MOBIC) 7.5 MG tablet Take 1 tablet p.o. twice daily or 2 tabs p.o. daily, prn. Take with food. 60 tablet 2     sulfaSALAzine (AZULFIDINE) 500 mg tablet Start with 1 tablet p.o. bid, thereafter if well tolerated, increase by 1 tablet weekly until maintenance dose of  3 tabs (1500 mg) bid. 180 tablet 2     No current facility-administered medications for this visit.   "          Physical Exam:  /80  Pulse 78  Resp 8  Ht 5' 11\" (1.803 m)  Wt 191 lb (86.6 kg)  BMI 26.64 kg/m2  General: A & O x 3 in NAD  HEENT: EOMI, Non injected/non icteric sclera, no oral lesions noted  CV: s1s2 with RRR, no rubs appreciated   Lungs: CTA B/L, no wheezing , rales or rhonci appreciated  GI: Soft, NT/ND, no rebound, no guarding noted  MS: Negative straight leg raising bilaterally.  Otherwise patient demonstrated good passive/active ROM over other joints with no warmth, erythema, tenderness or synovitis noted over these joints.      Summary/Assessment:    History that includes ankylosing spondylitis.    Presents for follow-up visit and claims to be doing \"is 99% better\" since starting sulfasalazine.    Just notices at times when being more active can lead to resurfacing of some back pain.    Did not pursue PT referral due to time constraints.    Takes meloxicam for breakthrough pain, finds beneficial.    Currently not taking Flexeril.    Claims to have cut back on alcohol beverage intake.    No clear signs of synovitis noted on exam today.    Please see below for management plan.    Pertinent rheumatology/past medical history (please refer to above for more detailed history):      Ankylosis spondylitis (HLA-B27 positive, positive family history/mother, abnormal imaging)    Chronic low back pain with left radiculopathy    Hx of lumbar herniated disc, lifting injury, 2016    Chronic left knee discomfort, giving out sensation    Iliotibial band tightness, bilateral    Loose stools    Acne (referred to dermatology, some improvement)    Vitamin D deficiency      Rheumatology medications provided/suggested:    Tylenol  Sulfasalazine  Meloxicam      Pertinent medication from other providers or from otc (please refer to above for more detailed med list):    Flexeril        Pertinent medications already tried:     Neurontin (grogginess)  Advil versus Aleve    Pertinent lab history:      Positive " HLA-B27    Noted to have negative/unremarkable: AURELIO, rheumatoid factor, ESR    Low vitamin D      Pertinent imaging/test history:    MRI of lumbar spine from March 2018 compared to MRI from October 2016 showed:  CONCLUSION:  1.  At L5-S1, there is mild degenerative disc disease, and a small shallow central disc protrusion. No associated significant central canal or lateral recess stenosis or evidence for neural impingement.  2.  No other significant abnormality in the lumbar spine.  3.  Increased marrow fat along both SI joints probably related to remote sacroiliitis.     MRI of SI joints from May 2018:  CONCLUSION:  1.  Increased marrow fat along both SI joints probably the sequela of previous, remote sacroiliitis.  2.  Trace marrow edema and enhancement along the inferior aspect of both SI joints. No other signs of active inflammation-sacroiliitis. No synovitis.    EMG study from May 2015: Left lower extremity results were unremarkable.    X-ray of left knee was unremarkable.    Other:    Standing order for labs placed good through June 2019.    Did not pursue prior PT referral placed for: Sensations of left knee discomfort/giving out and bilateral iliotibial band tightness (admits to squatting often at work).     Plan:      Continue sulfasalazine 1500 mg twice daily.      Continue meloxicam for breakthrough pains.       Can continue taking Flexeril 10 mg nightly as needed    We will check labs today and by next visit..      Follow-up in 3 months.      Procedure note:       This note was transcribed using Dragon voice recognition software as a result unintentional grammatical errors or word substitutions may have occurred. Please contact our Rheumatology department if you need any clarification or if you have any related inquiries.      Rodrigo Morfin ....................  9/17/2018   3:49 PM

## 2021-06-20 NOTE — LETTER
Letter by Rodrigo Morfin DO at      Author: Rodrigo Morfin DO Service: -- Author Type: --    Filed:  Encounter Date: 1/28/2020 Status: (Other)         Blake Allen  [address] [place] [address]                 January 28, 2020      Dear Blake:    This letter is regard to your lab result on 1/02/2020 and below is what advised:     Mildly elevated ALT level (liver enzyme), not new, current level not worrisome. Recommending  decreasing intake of Meloxicam avoiding alcoholic beverages. Recheck ALT/AST in about 4 weeks     Otherwise remainder of lab results were unremarkable.    If you've already underwent or scheduled, please disregard this notice. Otherwise please call [phone] to schedule a lab appointment.         Sincerely,        Rodrigo Morfin DO

## 2022-02-03 ENCOUNTER — VIRTUAL VISIT (OUTPATIENT)
Dept: RHEUMATOLOGY | Facility: CLINIC | Age: 27
End: 2022-02-03
Payer: COMMERCIAL

## 2022-02-03 DIAGNOSIS — G89.29 CHRONIC BILATERAL LOW BACK PAIN WITH LEFT-SIDED SCIATICA: ICD-10-CM

## 2022-02-03 DIAGNOSIS — M45.8 ANKYLOSING SPONDYLITIS OF SACRAL REGION (H): Primary | ICD-10-CM

## 2022-02-03 DIAGNOSIS — G89.29 CHRONIC PAIN OF MULTIPLE JOINTS: ICD-10-CM

## 2022-02-03 DIAGNOSIS — M54.42 CHRONIC BILATERAL LOW BACK PAIN WITH LEFT-SIDED SCIATICA: ICD-10-CM

## 2022-02-03 DIAGNOSIS — M25.50 CHRONIC PAIN OF MULTIPLE JOINTS: ICD-10-CM

## 2022-02-03 DIAGNOSIS — E55.9 VITAMIN D DEFICIENCY: ICD-10-CM

## 2022-02-03 PROCEDURE — 99214 OFFICE O/P EST MOD 30 MIN: CPT | Mod: 95 | Performed by: INTERNAL MEDICINE

## 2022-02-03 RX ORDER — CYCLOBENZAPRINE HCL 5 MG
TABLET ORAL
Qty: 30 TABLET | Refills: 3 | Status: SHIPPED | OUTPATIENT
Start: 2022-02-03

## 2022-02-03 RX ORDER — GABAPENTIN 100 MG/1
CAPSULE ORAL
Qty: 30 CAPSULE | Refills: 2 | Status: SHIPPED | OUTPATIENT
Start: 2022-02-03

## 2022-02-03 NOTE — PROGRESS NOTES
Blake Allen who presents today with a chief complaint of  RECHECK      Joint Pains: Yes  Location: knees, ankles, wrists and elbows  Onset: chronic  Intensity:  2 /10  AM Stiffness: 10 Minutes  Alleviating/Aggravating Factors: Medications helpful? No taking any medications right now since he ran out of them.  Tolerating Meds: Yes  Other:      ROS:  Patient denies having any active: chest pain, (+)shortness of breath, (+, chronic)cough, abdominal pain, nausea, vomiting, rashes, documented fevers, oral ulcers and recent infections.  Patient admits to having a good appetite  Information gathered by medical assistant incorporated into this note, was reviewed and discussed with the patient.    Problem List:  Patient Active Problem List   Diagnosis     Migraine Headache        PMH:   No past medical history on file.    Surgical History:  Past Surgical History:   Procedure Laterality Date     HC REMOVAL ADENOIDS,PRIMARY,<13 Y/O      Description: Adenoidectomy;  Recorded: 07/23/2008;       Family History:  Family History   Problem Relation Age of Onset     Ankylosing Spondylitis Mother        Social History:   reports that he has quit smoking. He has never used smokeless tobacco.    Allergies:  No Known Allergies     Current Medications:  Current Outpatient Medications   Medication Sig Dispense Refill     cyclobenzaprine (FLEXERIL) 10 MG tablet [CYCLOBENZAPRINE (FLEXERIL) 10 MG TABLET] Take 1 tablet (10 mg total) by mouth as needed. 20 tablet 0     ergocalciferol (VITAMIN D2) 50,000 unit capsule [ERGOCALCIFEROL (VITAMIN D2) 50,000 UNIT CAPSULE] Take 1 capsule by mouth on Monday and Thursday for 4 weeks thereafter take one capsule once a week for 8 weeks. 16 capsule 0     gabapentin (NEURONTIN) 100 MG capsule [GABAPENTIN (NEURONTIN) 100 MG CAPSULE] 1-3 tablets po at HS as directed 90 capsule 5     ibuprofen (ADVIL,MOTRIN) 200 MG tablet [IBUPROFEN (ADVIL,MOTRIN) 200 MG TABLET] Take 200 mg by mouth every 6 (six) hours  as needed for pain.       meloxicam (MOBIC) 7.5 MG tablet [MELOXICAM (MOBIC) 7.5 MG TABLET] Take 1 tablet p.o. twice daily or 2 tabs p.o. daily, prn. Take with food. 180 tablet 1     sulfaSALAzine (AZULFIDINE) 500 mg tablet [SULFASALAZINE (AZULFIDINE) 500 MG TABLET] Take 3 tablets (1,500 mg total) by mouth 2 (two) times a day. 540 tablet 0           Physical Exam:  There were no vitals taken for this visit.    Following up today via video visit, per Covid-19 pandemic requirements.    Verbal consent has been obtained for this service by care team member.    Video call start time: 3:17 PM    Video call end time: 3:37 PM    [employer] utilized for video call.    Phone number utilized: [[phone]]    Patient location for video visit: Home     Provider location for video visit:  Home (working remotely)    Summary/Assessment:    History that includes ankylosing spondylitis.    Patient last seen December 2019 (26 months ago).    Presents for a follow-up visit.     has been experiencing resurfacing pains involving low back and hands primarily.    On video exam points to MCP joints being more tender thinks may be swollen.  Difficult to tell on video exam if puffiness over these regions related to joints versus dorsum of hand/body habitus.     was doing better when on sulfasalazine.  Patient was also on meloxicam at the time however advised to hold due to elevated liver enzymes.      Patient states that he is still drinking alcohol beverages however has cut back.  States he used to drink 3-4 drinks every day of the week now has about 1-2 drinks 4 days/week.     currently not taking vitamin D, was deficient in the past and completed replenishing dose.     received gabapentin 100-300 mg nightly in the past by another provider when experiencing left lower extremity radiculopathy, desires refill to be taken if/when active.    Patient also benefited from Flexeril for low back pains, desires refill.    "would rather have a lower dose as sometimes made him groggy the next day.    Please see below for management plan.      From prior note(s): In April 2019, had moderate case of transaminitis which he states was due to having a few parties/drinking alcoholic beverages over the course of 1 week.  Adds that typically when he is on sulfasalazine he is aware avoid drinking alcoholic beverages regularly or to binge drink.    Repeat liver enzyme levels recently performed, scanned in our system noted to be within normal limits.    Initially, claimed to be doing \"is 99% better\" since starting sulfasalazine.      Pertinent rheumatology/past medical history (please refer to above for more detailed history):      Ankylosis spondylitis (HLA-B27 positive, positive family history/mother, abnormal imaging)    Chronic low back pain with left radiculopathy    Hx of lumbar herniated disc, lifting injury, 2016    Chronic left knee discomfort, giving out sensation    Iliotibial band tightness, bilateral    Loose stools    Acne (seen Dermatology, also component of rosacea)    Vitamin D deficiency    History elevated liver enzymes, improved with avoiding alcohol      Rheumatology medications provided/suggested:    Sulfasalazine  Meloxicam      Pertinent medication from other providers or from otc (please refer to above for more detailed med list):    Flexeril      Pertinent medications already tried:     Neurontin (grogginess)  Advil versus Aleve  Tylenol    Pertinent lab history:      Positive HLA-B27    Noted to have negative/unremarkable: AURELIO, rheumatoid factor, ESR    Low vitamin D      Pertinent imaging/test history:    MRI of lumbar spine from March 2018 compared to MRI from October 2016 showed:  CONCLUSION:  1.  At L5-S1, there is mild degenerative disc disease, and a small shallow central disc protrusion. No associated significant central canal or lateral recess stenosis or evidence for neural impingement.  2.  No other significant " abnormality in the lumbar spine.  3.  Increased marrow fat along both SI joints probably related to remote sacroiliitis.     MRI of SI joints from May 2018:  CONCLUSION:  1.  Increased marrow fat along both SI joints probably the sequela of previous, remote sacroiliitis.  2.  Trace marrow edema and enhancement along the inferior aspect of both SI joints. No other signs of active inflammation-sacroiliitis. No synovitis.    EMG study from May 2015: Left lower extremity results were unremarkable.    X-ray of left knee was unremarkable.    Other:    Standing order for labs placed, every 8 weeks good through June 2020.    Did not pursue prior PT referral placed for: Sensations of left knee discomfort/giving out and bilateral iliotibial band tightness (admits to squatting often at work).     On prior visit, had lengthy discussion with the patient regarding avoiding alcohol beverage intake while on sulfasalazine, as combination of both can cause liver toxicity which can lead to liver failure.  Patient stated that he is able to significantly limit alcohol beverage intake as he had in the past.        Plan:      Patient was taking sulfasalazine 1500 mg twice a day.  States did not have any repeat labs since last checked a couple years ago, at the time had mildly elevated liver enzyme.  Will first recheck level and based on results we will consider reinitiating 1000 mg twice a day and increase based on repeat lab response.  If liver enzymes are elevated, discussed biologic options.  Patient would be open to trying biologic such as Enbrel or Humira, went over major side effect profile particularly increased risk for infections.  States his mother is on a biologic medication.    We will refill Flexeril 2.5-10 mg prior to bedtime.    Will refill gabapentin 100-300 mg nightly prior to bedtime.    We will hold off on adding back meloxicam at this time, based on response to the above and labs, if still necessary we will consider  reinitiating.    We will include vitamin D level in lab orders..    Follow-up 2 to 3 months.  Emphasized importance for him to be compliant with follow-up visit.    Procedure note:       This note was transcribed using Dragon voice recognition software as a result unintentional grammatical errors or word substitutions may have occurred. Please contact our Rheumatology department if you need any clarification or if you have any related inquiries.    Major side effect profile of medications provided were discussed with the patient.      Rodrigo Morfin DO....................  2/3/2022   2:06 PM

## 2022-02-03 NOTE — PATIENT INSTRUCTIONS
Summary of Your Rheumatology Visit    Next Appointment:   2-3 Months    Medications:    Please follow directives on pill bottle on how to take medication(s) provided.      Referrals:      Tests:     Please have labs that were ordered performed.       Injections:        Other:

## 2022-02-08 ENCOUNTER — TELEPHONE (OUTPATIENT)
Dept: RHEUMATOLOGY | Facility: CLINIC | Age: 27
End: 2022-02-08

## 2022-02-08 NOTE — TELEPHONE ENCOUNTER
Lvmtcb. Please give pt a call to schedule. Thank you      Next Appointment:   2-3 Months      Tests:      Please have labs that were ordered performed.

## 2022-02-19 ENCOUNTER — HEALTH MAINTENANCE LETTER (OUTPATIENT)
Age: 27
End: 2022-02-19

## 2022-02-24 ENCOUNTER — LAB (OUTPATIENT)
Dept: LAB | Facility: CLINIC | Age: 27
End: 2022-02-24
Payer: COMMERCIAL

## 2022-02-24 DIAGNOSIS — G89.29 CHRONIC PAIN OF MULTIPLE JOINTS: ICD-10-CM

## 2022-02-24 DIAGNOSIS — M45.8 ANKYLOSING SPONDYLITIS OF SACRAL REGION (H): ICD-10-CM

## 2022-02-24 DIAGNOSIS — E55.9 VITAMIN D DEFICIENCY: ICD-10-CM

## 2022-02-24 DIAGNOSIS — M25.50 CHRONIC PAIN OF MULTIPLE JOINTS: ICD-10-CM

## 2022-02-24 LAB
ALT SERPL W P-5'-P-CCNC: 47 U/L (ref 0–70)
AST SERPL W P-5'-P-CCNC: 18 U/L (ref 0–45)
BASOPHILS # BLD AUTO: 0 10E3/UL (ref 0–0.2)
BASOPHILS NFR BLD AUTO: 0 %
CALCIUM SERPL-MCNC: 9.4 MG/DL (ref 8.5–10.1)
CREAT SERPL-MCNC: 1.23 MG/DL (ref 0.66–1.25)
CRP SERPL-MCNC: 3 MG/L (ref 0–8)
EOSINOPHIL # BLD AUTO: 0.2 10E3/UL (ref 0–0.7)
EOSINOPHIL NFR BLD AUTO: 2 %
ERYTHROCYTE [DISTWIDTH] IN BLOOD BY AUTOMATED COUNT: 13 % (ref 10–15)
ERYTHROCYTE [SEDIMENTATION RATE] IN BLOOD BY WESTERGREN METHOD: 5 MM/HR (ref 0–15)
GFR SERPL CREATININE-BSD FRML MDRD: 83 ML/MIN/1.73M2
HCT VFR BLD AUTO: 44.9 % (ref 40–53)
HGB BLD-MCNC: 15.5 G/DL (ref 13.3–17.7)
LYMPHOCYTES # BLD AUTO: 2.2 10E3/UL (ref 0.8–5.3)
LYMPHOCYTES NFR BLD AUTO: 26 %
MCH RBC QN AUTO: 30.3 PG (ref 26.5–33)
MCHC RBC AUTO-ENTMCNC: 34.5 G/DL (ref 31.5–36.5)
MCV RBC AUTO: 88 FL (ref 78–100)
MONOCYTES # BLD AUTO: 0.5 10E3/UL (ref 0–1.3)
MONOCYTES NFR BLD AUTO: 6 %
NEUTROPHILS # BLD AUTO: 5.8 10E3/UL (ref 1.6–8.3)
NEUTROPHILS NFR BLD AUTO: 67 %
PLATELET # BLD AUTO: 313 10E3/UL (ref 150–450)
RBC # BLD AUTO: 5.11 10E6/UL (ref 4.4–5.9)
WBC # BLD AUTO: 8.7 10E3/UL (ref 4–11)

## 2022-02-24 PROCEDURE — 36415 COLL VENOUS BLD VENIPUNCTURE: CPT

## 2022-02-24 PROCEDURE — 86140 C-REACTIVE PROTEIN: CPT

## 2022-02-24 PROCEDURE — 84460 ALANINE AMINO (ALT) (SGPT): CPT

## 2022-02-24 PROCEDURE — 85652 RBC SED RATE AUTOMATED: CPT

## 2022-02-24 PROCEDURE — 82310 ASSAY OF CALCIUM: CPT

## 2022-02-24 PROCEDURE — 82306 VITAMIN D 25 HYDROXY: CPT

## 2022-02-24 PROCEDURE — 82565 ASSAY OF CREATININE: CPT

## 2022-02-24 PROCEDURE — 84450 TRANSFERASE (AST) (SGOT): CPT

## 2022-02-24 PROCEDURE — 86481 TB AG RESPONSE T-CELL SUSP: CPT

## 2022-02-24 PROCEDURE — 85025 COMPLETE CBC W/AUTO DIFF WBC: CPT

## 2022-02-25 LAB — DEPRECATED CALCIDIOL+CALCIFEROL SERPL-MC: 6 UG/L (ref 20–75)

## 2022-02-26 LAB
GAMMA INTERFERON BACKGROUND BLD IA-ACNC: 0.08 IU/ML
M TB IFN-G BLD-IMP: NEGATIVE
M TB IFN-G CD4+ BCKGRND COR BLD-ACNC: 9.92 IU/ML
MITOGEN IGNF BCKGRD COR BLD-ACNC: -0.01 IU/ML
MITOGEN IGNF BCKGRD COR BLD-ACNC: -0.02 IU/ML
QUANTIFERON MITOGEN: 10 IU/ML
QUANTIFERON NIL TUBE: 0.08 IU/ML
QUANTIFERON TB1 TUBE: 0.06 IU/ML
QUANTIFERON TB2 TUBE: 0.07

## 2022-03-01 ENCOUNTER — TELEPHONE (OUTPATIENT)
Dept: RHEUMATOLOGY | Facility: CLINIC | Age: 27
End: 2022-03-01
Payer: COMMERCIAL

## 2022-03-01 DIAGNOSIS — E55.9 VITAMIN D DEFICIENCY: Primary | ICD-10-CM

## 2022-03-01 RX ORDER — ERGOCALCIFEROL 1.25 MG/1
CAPSULE, LIQUID FILLED ORAL
Qty: 16 CAPSULE | Refills: 0 | Status: SHIPPED | OUTPATIENT
Start: 2022-03-01

## 2022-03-01 NOTE — TELEPHONE ENCOUNTER
[employer] message sent to pt with result comments. Lab order and Vitamin D rx sent to pharmacy per Dr Morfin.

## 2022-03-01 NOTE — TELEPHONE ENCOUNTER
----- Message from Rodrigo Morfin DO sent at 2/28/2022 10:40 PM CST -----  Vitamin D level was noted to be low, recommend replenishing with 50,000 units Monday and Thursday for 4 weeks thereafter once a week for 8 weeks for total of 16 doses thereafter recheck level.    Otherwise remainder of lab results were within normal limits.

## 2022-04-25 ENCOUNTER — LAB (OUTPATIENT)
Dept: LAB | Facility: CLINIC | Age: 27
End: 2022-04-25
Payer: COMMERCIAL

## 2022-04-25 DIAGNOSIS — M45.8 ANKYLOSING SPONDYLITIS OF SACRAL REGION (H): ICD-10-CM

## 2022-04-25 LAB
ALBUMIN SERPL-MCNC: 4.1 G/DL (ref 3.4–5)
ALT SERPL W P-5'-P-CCNC: 100 U/L (ref 0–70)
AST SERPL W P-5'-P-CCNC: 102 U/L (ref 0–45)
BASOPHILS # BLD AUTO: 0 10E3/UL (ref 0–0.2)
BASOPHILS NFR BLD AUTO: 0 %
CREAT SERPL-MCNC: 1.17 MG/DL (ref 0.66–1.25)
EOSINOPHIL # BLD AUTO: 0.2 10E3/UL (ref 0–0.7)
EOSINOPHIL NFR BLD AUTO: 2 %
ERYTHROCYTE [DISTWIDTH] IN BLOOD BY AUTOMATED COUNT: 11.8 % (ref 10–15)
GFR SERPL CREATININE-BSD FRML MDRD: 88 ML/MIN/1.73M2
HCT VFR BLD AUTO: 45.6 % (ref 40–53)
HGB BLD-MCNC: 15.9 G/DL (ref 13.3–17.7)
IMM GRANULOCYTES # BLD: 0 10E3/UL
IMM GRANULOCYTES NFR BLD: 1 %
LYMPHOCYTES # BLD AUTO: 2.6 10E3/UL (ref 0.8–5.3)
LYMPHOCYTES NFR BLD AUTO: 33 %
MCH RBC QN AUTO: 30.8 PG (ref 26.5–33)
MCHC RBC AUTO-ENTMCNC: 34.9 G/DL (ref 31.5–36.5)
MCV RBC AUTO: 88 FL (ref 78–100)
MONOCYTES # BLD AUTO: 0.5 10E3/UL (ref 0–1.3)
MONOCYTES NFR BLD AUTO: 7 %
NEUTROPHILS # BLD AUTO: 4.6 10E3/UL (ref 1.6–8.3)
NEUTROPHILS NFR BLD AUTO: 57 %
PLATELET # BLD AUTO: 322 10E3/UL (ref 150–450)
RBC # BLD AUTO: 5.17 10E6/UL (ref 4.4–5.9)
WBC # BLD AUTO: 8 10E3/UL (ref 4–11)

## 2022-04-25 PROCEDURE — 36415 COLL VENOUS BLD VENIPUNCTURE: CPT

## 2022-04-25 PROCEDURE — 84450 TRANSFERASE (AST) (SGOT): CPT

## 2022-04-25 PROCEDURE — 82040 ASSAY OF SERUM ALBUMIN: CPT

## 2022-04-25 PROCEDURE — 84460 ALANINE AMINO (ALT) (SGPT): CPT

## 2022-04-25 PROCEDURE — 85025 COMPLETE CBC W/AUTO DIFF WBC: CPT

## 2022-04-25 PROCEDURE — 82565 ASSAY OF CREATININE: CPT

## 2022-05-06 ENCOUNTER — TELEPHONE (OUTPATIENT)
Dept: RHEUMATOLOGY | Facility: CLINIC | Age: 27
End: 2022-05-06
Payer: COMMERCIAL

## 2022-05-06 DIAGNOSIS — R79.89 ELEVATED LFTS: Primary | ICD-10-CM

## 2022-05-06 NOTE — TELEPHONE ENCOUNTER
----- Message from Rodrigo Morfin DO sent at 5/5/2022 12:27 AM CDT -----  Please discuss with the patient:    Noted to have elevated AST/ALT liver enzymes, not new with levels fluctuating.  Please inquire if drinking alcoholic beverages?  If yes, how how many per day/per week?  Would then recommend cutting back and rechecking AST/ALT in 3 to 4 weeks.  If not drinking alcohol beverages, recommend patient see a liver specialist/hepatologist and recheck AST/ALT levels again in 3 to 4 weeks.    Normal CBC cell count, serum albumin and creatinine/kidney function levels.

## 2022-05-24 NOTE — TELEPHONE ENCOUNTER
Pt notified of lab result comments. Pt states he drinks alcohol occasionally but not in excess. Pt will schedule to recheck labs as ordered and if still elevated he will see GI specialist

## 2022-05-26 ENCOUNTER — LAB (OUTPATIENT)
Dept: LAB | Facility: CLINIC | Age: 27
End: 2022-05-26
Payer: COMMERCIAL

## 2022-05-26 DIAGNOSIS — R79.89 ELEVATED LFTS: ICD-10-CM

## 2022-05-26 DIAGNOSIS — M45.8 ANKYLOSING SPONDYLITIS OF SACRAL REGION (H): ICD-10-CM

## 2022-05-26 DIAGNOSIS — E55.9 VITAMIN D DEFICIENCY: ICD-10-CM

## 2022-05-26 LAB
ALBUMIN SERPL-MCNC: 3.9 G/DL (ref 3.4–5)
ALT SERPL W P-5'-P-CCNC: 48 U/L (ref 0–70)
AST SERPL W P-5'-P-CCNC: 15 U/L (ref 0–45)
BASOPHILS # BLD AUTO: 0 10E3/UL (ref 0–0.2)
BASOPHILS NFR BLD AUTO: 0 %
CREAT SERPL-MCNC: 1.12 MG/DL (ref 0.66–1.25)
EOSINOPHIL # BLD AUTO: 0.2 10E3/UL (ref 0–0.7)
EOSINOPHIL NFR BLD AUTO: 2 %
ERYTHROCYTE [DISTWIDTH] IN BLOOD BY AUTOMATED COUNT: 11.8 % (ref 10–15)
GFR SERPL CREATININE-BSD FRML MDRD: >90 ML/MIN/1.73M2
HCT VFR BLD AUTO: 44 % (ref 40–53)
HGB BLD-MCNC: 15.1 G/DL (ref 13.3–17.7)
IMM GRANULOCYTES # BLD: 0 10E3/UL
IMM GRANULOCYTES NFR BLD: 1 %
LYMPHOCYTES # BLD AUTO: 2.4 10E3/UL (ref 0.8–5.3)
LYMPHOCYTES NFR BLD AUTO: 32 %
MCH RBC QN AUTO: 30.4 PG (ref 26.5–33)
MCHC RBC AUTO-ENTMCNC: 34.3 G/DL (ref 31.5–36.5)
MCV RBC AUTO: 89 FL (ref 78–100)
MONOCYTES # BLD AUTO: 0.5 10E3/UL (ref 0–1.3)
MONOCYTES NFR BLD AUTO: 6 %
NEUTROPHILS # BLD AUTO: 4.4 10E3/UL (ref 1.6–8.3)
NEUTROPHILS NFR BLD AUTO: 58 %
PLATELET # BLD AUTO: 280 10E3/UL (ref 150–450)
RBC # BLD AUTO: 4.96 10E6/UL (ref 4.4–5.9)
WBC # BLD AUTO: 7.6 10E3/UL (ref 4–11)

## 2022-05-26 PROCEDURE — 84460 ALANINE AMINO (ALT) (SGPT): CPT

## 2022-05-26 PROCEDURE — 36415 COLL VENOUS BLD VENIPUNCTURE: CPT

## 2022-05-26 PROCEDURE — 85025 COMPLETE CBC W/AUTO DIFF WBC: CPT

## 2022-05-26 PROCEDURE — 84450 TRANSFERASE (AST) (SGOT): CPT

## 2022-05-26 PROCEDURE — 82565 ASSAY OF CREATININE: CPT

## 2022-05-26 PROCEDURE — 82040 ASSAY OF SERUM ALBUMIN: CPT

## 2022-05-26 PROCEDURE — 82306 VITAMIN D 25 HYDROXY: CPT

## 2022-05-27 LAB — DEPRECATED CALCIDIOL+CALCIFEROL SERPL-MC: 10 UG/L (ref 20–75)

## 2022-05-31 RX ORDER — SULFASALAZINE 500 MG/1
TABLET ORAL
Qty: 360 TABLET | Refills: 0 | Status: SHIPPED | OUTPATIENT
Start: 2022-05-31

## 2022-05-31 RX ORDER — SULFASALAZINE 500 MG/1
TABLET ORAL
Qty: 120 TABLET | Refills: 1 | Status: SHIPPED | OUTPATIENT
Start: 2022-05-31 | End: 2022-05-31

## 2022-05-31 NOTE — TELEPHONE ENCOUNTER
Resent rx for sulfasalazine for 90 day supply per pharmacy request.   Refilled per Rheum RN refill protocol

## 2022-06-02 ENCOUNTER — VIRTUAL VISIT (OUTPATIENT)
Dept: RHEUMATOLOGY | Facility: CLINIC | Age: 27
End: 2022-06-02
Payer: COMMERCIAL

## 2022-06-02 DIAGNOSIS — E55.9 VITAMIN D DEFICIENCY: ICD-10-CM

## 2022-06-02 DIAGNOSIS — R79.89 ELEVATED LFTS: ICD-10-CM

## 2022-06-02 DIAGNOSIS — M45.8 ANKYLOSING SPONDYLITIS OF SACRAL REGION (H): Primary | ICD-10-CM

## 2022-06-02 DIAGNOSIS — M25.50 CHRONIC PAIN OF MULTIPLE JOINTS: ICD-10-CM

## 2022-06-02 DIAGNOSIS — G89.29 CHRONIC PAIN OF MULTIPLE JOINTS: ICD-10-CM

## 2022-06-02 PROCEDURE — 99214 OFFICE O/P EST MOD 30 MIN: CPT | Mod: 95 | Performed by: INTERNAL MEDICINE

## 2022-06-02 RX ORDER — ERGOCALCIFEROL 1.25 MG/1
50000 CAPSULE, LIQUID FILLED ORAL
Qty: 12 CAPSULE | Refills: 0 | Status: SHIPPED | OUTPATIENT
Start: 2022-07-02

## 2022-06-02 NOTE — PATIENT INSTRUCTIONS
Summary of Your Rheumatology Visit    Next Appointment:   3 months    Medications:    Please follow directives on pill bottle on how to take medication(s) provided.    Referrals:      Tests:     Please have labs performed in about 11-12 weeks.    Injections:        Other:

## 2022-06-02 NOTE — PROGRESS NOTES
Blake Allen who presents today with a chief complaint of  No chief complaint on file.      Joint Pains: yes  Location: low back  Onset: ongoing  Intensity:  2/10  AM Stiffness: none  Alleviating/Aggravating Factors: laying down help  Tolerating Meds: yes  Other:      ROS:  Patient denies having any chest pain, shortness of breath, cough, abdominal pain, nausea, vomiting, rashes, fevers, oral ulcers and recent infections.  Patient admits to having a good appetite      Problem List:  Patient Active Problem List   Diagnosis     Migraine Headache        PMH:   No past medical history on file.    Surgical History:  Past Surgical History:   Procedure Laterality Date     HC REMOVAL ADENOIDS,PRIMARY,<11 Y/O      Description: Adenoidectomy;  Recorded: 07/23/2008;       Family History:  Family History   Problem Relation Age of Onset     Ankylosing Spondylitis Mother        Social History:   reports that he has quit smoking. He has never used smokeless tobacco.    Allergies:  No Known Allergies     Current Medications:  Current Outpatient Medications   Medication Sig Dispense Refill     cyclobenzaprine (FLEXERIL) 5 MG tablet Take 1/2-1 tab prior to bedtime, prn.  If still symptomatic or if still not sleeping well and well-tolerated can increase by half tablet (half milligram increments) up to 2 tablets prior to bedtime, as needed. 30 tablet 3     gabapentin (NEURONTIN) 100 MG capsule [GABAPENTIN (NEURONTIN) 100 MG CAPSULE] 1-3 tablets po at HS as directed 30 capsule 2     ibuprofen (ADVIL,MOTRIN) 200 MG tablet [IBUPROFEN (ADVIL,MOTRIN) 200 MG TABLET] Take 200 mg by mouth every 6 (six) hours as needed for pain. (Patient not taking: Reported on 2/3/2022)       sulfaSALAzine (AZULFIDINE) 500 MG tablet Take 2 tablet p.o. twice daily. 360 tablet 0     vitamin D2 (ERGOCALCIFEROL) 11184 units (1250 mcg) capsule Take 1 capsule po on Monday and Thursday for 4 weeks thereafter take 1 capsule po once a week for 8 weeks 16  capsule 0           Physical Exam:  Following up today via video visit, per Covid-19 pandemic requirements.    Verbal consent has been obtained for this service by care team member.    Video call start time: 2:59 PM    Video call end time: 3:11 PM    [employer] utilized for video call.    Patient location for video visit: Home     Provider location for video visit:  Home (working remotely)      Summary/Assessment:    History that includes ankylosing spondylitis.    Presents for follow-up visit.    Liver enzymes were elevated about 5 weeks ago.   just had a few drinks prior as was around the time of his birthday.  Patient had repeat labs performed about 1 week ago showing normalization of liver enzymes.    Takes sulfasalazine 1000 mg twice a day.  Since restarting sulfasalazine a few months ago has noticed improvement of joint pains and swelling.    Patient had significantly low vitamin D level about 3 months ago, replenishing course prescribed however  has taken only about half the course.  Patient had repeat level performed about 1 week ago with only slight improvement    On prior visit, stated received gabapentin 100-300 mg nightly in the past by another provider when experiencing left lower extremity radiculopathy, desired refill to be taken if/when active.    Patient also benefited from Flexeril for low back pains.    Normal kidney function and CBC/cell count.    Please see below for management plan.      From prior note(s):     -  has been experiencing resurfacing pains involving low back and hands primarily.    On video exam points to MCP joints being more tender thinks may be swollen.  Difficult to tell on video exam if puffiness over these regions related to joints versus dorsum of hand/body habitus.     was doing better when on sulfasalazine.     Patient states that he is still drinking alcohol beverages however has cut back.  States he used to drink 3-4 drinks every day of the week now  "has about 1-2 drinks 4 days/week.        - In April 2019, had moderate case of transaminitis which he states was due to having a few parties/drinking alcoholic beverages over the course of 1 week.  Adds that typically when he is on sulfasalazine he is aware avoid drinking alcoholic beverages regularly or to binge drink.    Repeat liver enzyme levels recently performed, scanned in our system noted to be within normal limits.    Initially, claimed to be doing \"is 99% better\" since starting sulfasalazine.      Pertinent rheumatology/past medical history (please refer to above for more detailed history):      Ankylosis spondylitis (HLA-B27 positive, positive family history/mother, abnormal imaging)    Chronic low back pain with left radiculopathy    Hx of lumbar herniated disc, lifting injury, 2016    Chronic left knee discomfort, giving out sensation    Iliotibial band tightness, bilateral    Loose stools    Acne (seen Dermatology, also component of rosacea)    Vitamin D deficiency    History elevated liver enzymes, improved with avoiding alcohol      Rheumatology medications provided/suggested:    Sulfasalazine  Meloxicam      Pertinent medication from other providers or from otc (please refer to above for more detailed med list):    Flexeril      Pertinent medications already tried:     Neurontin (grogginess)  Advil versus Aleve  Tylenol    Pertinent lab history:      Positive HLA-B27    Noted to have negative/unremarkable: AURELIO, rheumatoid factor, ESR    Low vitamin D      Pertinent imaging/test history:    MRI of lumbar spine from March 2018 compared to MRI from October 2016 showed:  CONCLUSION:  1.  At L5-S1, there is mild degenerative disc disease, and a small shallow central disc protrusion. No associated significant central canal or lateral recess stenosis or evidence for neural impingement.  2.  No other significant abnormality in the lumbar spine.  3.  Increased marrow fat along both SI joints probably related " to remote sacroiliitis.     MRI of SI joints from May 2018:  CONCLUSION:  1.  Increased marrow fat along both SI joints probably the sequela of previous, remote sacroiliitis.  2.  Trace marrow edema and enhancement along the inferior aspect of both SI joints. No other signs of active inflammation-sacroiliitis. No synovitis.    EMG study from May 2015: Left lower extremity results were unremarkable.    X-ray of left knee was unremarkable.    Other:    Standing order for labs placed, every 8 weeks good through June 2020.    Did not pursue prior PT referral placed for: Sensations of left knee discomfort/giving out and bilateral iliotibial band tightness (admits to squatting often at work).     On prior visit, had lengthy discussion with the patient regarding avoiding alcohol beverage intake while on sulfasalazine, as combination of both can cause liver toxicity which can lead to liver failure.  Patient stated that he is able to significantly limit alcohol beverage intake as he had in the past.         Plan:      Continue sulfasalazine 1000 mg twice a day.    Continue Flexeril 2.5-10 mg prior to bedtime.    Continue gabapentin 100-300 mg nightly prior to bedtime.    Due to prior history of elevated liver enzymes and current stability will hold off on adding back meloxicam.    Continue replenishing course of vitamin D, has about 1 month left from prior prescription.  We will provide an additional 3 months.  Thereafter advised to take over-the-counter 1000 units of vitamin D daily.    Recheck labs in about 12 weeks    Follow-up 3 months.      This note was transcribed using Dragon voice recognition software as a result unintentional grammatical errors or word substitutions may have occurred. Please contact our Rheumatology department if you need any clarification or if you have any related inquiries.    Major side effect profile of medications provided were discussed with the patient.    Rodrigo Morfin DO  ....................  6/2/2022   12:43 PM

## 2022-09-01 ENCOUNTER — TRANSFERRED RECORDS (OUTPATIENT)
Dept: HEALTH INFORMATION MANAGEMENT | Facility: CLINIC | Age: 27
End: 2022-09-01

## 2022-09-01 DIAGNOSIS — M45.8 ANKYLOSING SPONDYLITIS OF SACRAL REGION (H): ICD-10-CM

## 2022-09-01 RX ORDER — SULFASALAZINE 500 MG/1
TABLET ORAL
Qty: 360 TABLET | Refills: 0 | Status: CANCELLED | OUTPATIENT
Start: 2022-09-01

## 2022-09-01 NOTE — TELEPHONE ENCOUNTER
Please call patient to schedule appointment to establish with new Rheum provider for medication refills.    Pt can request refill from PCP to manage care and treatment if desired.

## 2022-10-22 ENCOUNTER — HEALTH MAINTENANCE LETTER (OUTPATIENT)
Age: 27
End: 2022-10-22

## 2023-04-01 ENCOUNTER — HEALTH MAINTENANCE LETTER (OUTPATIENT)
Age: 28
End: 2023-04-01

## 2024-06-01 ENCOUNTER — HEALTH MAINTENANCE LETTER (OUTPATIENT)
Age: 29
End: 2024-06-01

## 2025-01-16 ENCOUNTER — LAB (OUTPATIENT)
Dept: LAB | Facility: CLINIC | Age: 30
End: 2025-01-16
Payer: COMMERCIAL

## 2025-01-16 DIAGNOSIS — M46.1 SACROILIITIS: ICD-10-CM

## 2025-01-16 LAB
BASOPHILS # BLD AUTO: 0 10E3/UL (ref 0–0.2)
BASOPHILS NFR BLD AUTO: 0 %
EOSINOPHIL # BLD AUTO: 0.1 10E3/UL (ref 0–0.7)
EOSINOPHIL NFR BLD AUTO: 1 %
ERYTHROCYTE [DISTWIDTH] IN BLOOD BY AUTOMATED COUNT: 11.6 % (ref 10–15)
HCT VFR BLD AUTO: 42.6 % (ref 40–53)
HGB BLD-MCNC: 15.6 G/DL (ref 13.3–17.7)
IMM GRANULOCYTES # BLD: 0 10E3/UL
IMM GRANULOCYTES NFR BLD: 0 %
LYMPHOCYTES # BLD AUTO: 1.9 10E3/UL (ref 0.8–5.3)
LYMPHOCYTES NFR BLD AUTO: 20 %
MCH RBC QN AUTO: 31.4 PG (ref 26.5–33)
MCHC RBC AUTO-ENTMCNC: 36.6 G/DL (ref 31.5–36.5)
MCV RBC AUTO: 86 FL (ref 78–100)
MONOCYTES # BLD AUTO: 0.5 10E3/UL (ref 0–1.3)
MONOCYTES NFR BLD AUTO: 5 %
NEUTROPHILS # BLD AUTO: 7.4 10E3/UL (ref 1.6–8.3)
NEUTROPHILS NFR BLD AUTO: 75 %
PLATELET # BLD AUTO: 324 10E3/UL (ref 150–450)
RBC # BLD AUTO: 4.97 10E6/UL (ref 4.4–5.9)
WBC # BLD AUTO: 10 10E3/UL (ref 4–11)

## 2025-01-18 LAB
GAMMA INTERFERON BACKGROUND BLD IA-ACNC: 0.02 IU/ML
M TB IFN-G BLD-IMP: NEGATIVE
M TB IFN-G CD4+ BCKGRND COR BLD-ACNC: 9.98 IU/ML
MITOGEN IGNF BCKGRD COR BLD-ACNC: 0.01 IU/ML
MITOGEN IGNF BCKGRD COR BLD-ACNC: 0.02 IU/ML
QUANTIFERON MITOGEN: 10 IU/ML
QUANTIFERON NIL TUBE: 0.02 IU/ML
QUANTIFERON TB1 TUBE: 0.04 IU/ML
QUANTIFERON TB2 TUBE: 0.03

## 2025-01-20 DIAGNOSIS — M46.1 SACROILIITIS: Primary | ICD-10-CM

## 2025-01-20 DIAGNOSIS — Z87.39 HISTORY OF ANKYLOSING SPONDYLITIS: ICD-10-CM

## 2025-01-29 ENCOUNTER — ANCILLARY PROCEDURE (OUTPATIENT)
Dept: MRI IMAGING | Facility: CLINIC | Age: 30
End: 2025-01-29
Attending: INTERNAL MEDICINE
Payer: COMMERCIAL

## 2025-01-29 DIAGNOSIS — Z87.39 HISTORY OF ANKYLOSING SPONDYLITIS: ICD-10-CM

## 2025-01-29 PROCEDURE — 72195 MRI PELVIS W/O DYE: CPT | Mod: 26 | Performed by: RADIOLOGY

## 2025-01-29 PROCEDURE — 72195 MRI PELVIS W/O DYE: CPT

## 2025-01-31 ENCOUNTER — TELEPHONE (OUTPATIENT)
Dept: RHEUMATOLOGY | Facility: CLINIC | Age: 30
End: 2025-01-31
Payer: COMMERCIAL

## 2025-01-31 NOTE — TELEPHONE ENCOUNTER
Call to patient and Lm to see if telephone visit could be set up with Dr. Montes De Oca to discuss MRI results. Awaiting return call.    Marti Hanson RN

## 2025-02-03 ENCOUNTER — VIRTUAL VISIT (OUTPATIENT)
Dept: RHEUMATOLOGY | Facility: CLINIC | Age: 30
End: 2025-02-03
Attending: INTERNAL MEDICINE
Payer: COMMERCIAL

## 2025-02-03 DIAGNOSIS — M46.1 SACROILIITIS: ICD-10-CM

## 2025-02-03 DIAGNOSIS — M46.90 AXIAL SPONDYLOARTHRITIS: Primary | ICD-10-CM

## 2025-02-03 DIAGNOSIS — Z15.89 HLA B27 (HLA B27 POSITIVE): ICD-10-CM

## 2025-02-03 DIAGNOSIS — Z84.0 FAMILY HISTORY OF PSORIASIS: ICD-10-CM

## 2025-02-03 NOTE — LETTER
2/3/2025       RE: Blake Allen  [id number]  [place] [address]     Dear Colleague,    Thank you for referring your patient, Blake Allen, to the [facility] at [facility]. Please see a copy of my visit note below.                           Chief Complaint/Reason for Visit: sacroilitis     HPI:    Blake Allen is a 29 year old White male with past medical history listed below.  He is c/o low back pain, both midline and lateralized to both sides. Occasionally radiates down his legs. Also has occasional pain and swelling of hands.     REVIEW OF SYSTEMS    Cardiovascular - neg for chest pain   Respiratory - neg for sob     No past medical history on file.  Past Surgical History:   Procedure Laterality Date     HC REMOVAL ADENOIDS,PRIMARY,<13 Y/O      Description: Adenoidectomy;  Recorded: 07/23/2008;     Family History   Problem Relation Age of Onset     Ankylosing Spondylitis Mother      Social History     Socioeconomic History     Marital status: Single   Tobacco Use     Smoking status: Former     Smokeless tobacco: Never     Tobacco comments:     quit 3 months ago   Social History Narrative    Diet-    Exercise-       No Known Allergies    Current Outpatient Medications   Medication Sig Dispense Refill     Adalimumab (HUMIRA *CF*) 40 MG/0.4ML pen kit Inject 0.4 mLs (40 mg) subcutaneously every 14 days. 0.8 mL 4     cyclobenzaprine (FLEXERIL) 5 MG tablet Take 1/2-1 tab prior to bedtime, prn.  If still symptomatic or if still not sleeping well and well-tolerated can increase by half tablet (half milligram increments) up to 2 tablets prior to bedtime, as needed. 30 tablet 3     gabapentin (NEURONTIN) 100 MG capsule [GABAPENTIN (NEURONTIN) 100 MG CAPSULE] 1-3 tablets po at HS as directed 30 capsule 2     ibuprofen (ADVIL,MOTRIN) 200 MG tablet Take 200 mg by mouth every 6 hours as needed       sulfaSALAzine  (AZULFIDINE) 500 MG tablet Take 2 tablet p.o. twice daily. 360 tablet 0     vitamin D2 (ERGOCALCIFEROL) 90897 units (1250 mcg) capsule Take 1 capsule (50,000 Units) by mouth every 7 days 12 capsule 0     vitamin D2 (ERGOCALCIFEROL) 77033 units (1250 mcg) capsule Take 1 capsule po on Monday and Thursday for 4 weeks thereafter take 1 capsule po once a week for 8 weeks 16 capsule 0     No current facility-administered medications for this visit.       PHYSICAL EXAM  Unable to do exam as this was a telephone encounter only.           LABS   Reviewed as below.    MR SI joints 2025    Impression:   1. Imaging finding can be concordant with sequale of bilateral chronic  sacroiliitis with active sacroiliitis on left iliac aspect.  *  No ankylosis.    2018    MR SI joints    IMPRESSION:  CONCLUSION:  1.  Increased marrow fat along both SI joints probably the sequela of previous, remote sacroiliitis.  2.  Trace marrow edema and enhancement along the inferior aspect of both SI joints. No other signs of active inflammation-sacroiliitis. No synovitis.     Latest Reference Range & Units 06/20/18 12:23 04/24/19 13:23   Hep B Surface Agn Negative  Negative Negative   Hepatitis A IgM Leena Negative   Negative   Hepatitis B Core IgM Negative   Negative   Hepatitis C Antibody Negative  Negative Negative       ASSESSMENT      1. Axial spondyloarthritis    2. HLA B27 (HLA B27 positive)    3. Sacroiliitis    4. Family history of psoriasis        Axial spondyloarthritis  (primary encounter diagnosis)  (Z87.39) History of ankylosing spondylitis  (Z15.89) HLA B27 (HLA B27 positive)  FH of psoriasis   Comment: HLA B 27 pos. MR SI joint in 2025 - Imaging finding can be concordant with sequale of bilateral chronic sacroiliitis with active sacroiliitis on left iliac aspect.  Plan:   Will start Humira 40 mg subcutaneously every 2 weeks.  Ref to MTM.   Follow up in 6 months.   Orders Placed This Encounter   Procedures     Med Therapy Management  Referral       TATIANA WANG MD    Division of Rheumatic & Autoimmune Diseases  [facility]       Again, thank you for allowing me to participate in the care of your patient.      Sincerely,    Tatiana Wang MD

## 2025-02-03 NOTE — PROGRESS NOTES
Verbal consent obtained from patient for telehealth services provided below.  Communication with patient was conducted via telephone and this was scheduled as telephone visit only.  Location of Patient: Home  Location of Provider: [facility]  Total time of visit: 5 minutes      Chief Complaint/Reason for Visit: sacroilitis     HPI:    Blake Allen is a 29 year old White male with past medical history listed below.  He is c/o low back pain, both midline and lateralized to both sides. Occasionally radiates down his legs. Also has occasional pain and swelling of hands.     REVIEW OF SYSTEMS    Cardiovascular - neg for chest pain   Respiratory - neg for sob     No past medical history on file.  Past Surgical History:   Procedure Laterality Date    HC REMOVAL ADENOIDS,PRIMARY,<11 Y/O      Description: Adenoidectomy;  Recorded: 07/23/2008;     Family History   Problem Relation Age of Onset    Ankylosing Spondylitis Mother      Social History     Socioeconomic History    Marital status: Single   Tobacco Use    Smoking status: Former    Smokeless tobacco: Never    Tobacco comments:     quit 3 months ago   Social History Narrative    Diet-    Exercise-       No Known Allergies    Current Outpatient Medications   Medication Sig Dispense Refill    Adalimumab (HUMIRA *CF*) 40 MG/0.4ML pen kit Inject 0.4 mLs (40 mg) subcutaneously every 14 days. 0.8 mL 4    cyclobenzaprine (FLEXERIL) 5 MG tablet Take 1/2-1 tab prior to bedtime, prn.  If still symptomatic or if still not sleeping well and well-tolerated can increase by half tablet (half milligram increments) up to 2 tablets prior to bedtime, as needed. 30 tablet 3    gabapentin (NEURONTIN) 100 MG capsule [GABAPENTIN (NEURONTIN) 100 MG CAPSULE] 1-3 tablets po at HS as directed 30 capsule 2    ibuprofen (ADVIL,MOTRIN) 200 MG tablet Take 200 mg by mouth every 6 hours as needed      sulfaSALAzine (AZULFIDINE) 500 MG tablet Take 2 tablet p.o. twice  daily. 360 tablet 0    vitamin D2 (ERGOCALCIFEROL) 84381 units (1250 mcg) capsule Take 1 capsule (50,000 Units) by mouth every 7 days 12 capsule 0    vitamin D2 (ERGOCALCIFEROL) 63694 units (1250 mcg) capsule Take 1 capsule po on Monday and Thursday for 4 weeks thereafter take 1 capsule po once a week for 8 weeks 16 capsule 0     No current facility-administered medications for this visit.       PHYSICAL EXAM  Unable to do exam as this was a telephone encounter only.           LABS   Reviewed as below.    MR SI joints 2025    Impression:   1. Imaging finding can be concordant with sequale of bilateral chronic  sacroiliitis with active sacroiliitis on left iliac aspect.  *  No ankylosis.    2018    MR SI joints    IMPRESSION:  CONCLUSION:  1.  Increased marrow fat along both SI joints probably the sequela of previous, remote sacroiliitis.  2.  Trace marrow edema and enhancement along the inferior aspect of both SI joints. No other signs of active inflammation-sacroiliitis. No synovitis.     Latest Reference Range & Units 06/20/18 12:23 04/24/19 13:23   Hep B Surface Agn Negative  Negative Negative   Hepatitis A IgM Leena Negative   Negative   Hepatitis B Core IgM Negative   Negative   Hepatitis C Antibody Negative  Negative Negative       ASSESSMENT      1. Axial spondyloarthritis    2. HLA B27 (HLA B27 positive)    3. Sacroiliitis    4. Family history of psoriasis        Axial spondyloarthritis  (primary encounter diagnosis)  (Z87.39) History of ankylosing spondylitis  (Z15.89) HLA B27 (HLA B27 positive)  FH of psoriasis   Comment: HLA B 27 pos. MR SI joint in 2025 - Imaging finding can be concordant with sequale of bilateral chronic sacroiliitis with active sacroiliitis on left iliac aspect.  Plan:   Will start Humira 40 mg subcutaneously every 2 weeks.  Ref to MTM.   Follow up in 6 months.   Orders Placed This Encounter   Procedures    Med Therapy Management Referral       ANDRZEJ WANG MD  Assistant  Professor  Division of Rheumatic & Autoimmune Diseases  [facility]

## 2025-02-04 ENCOUNTER — VIRTUAL VISIT (OUTPATIENT)
Dept: PHARMACY | Facility: CLINIC | Age: 30
End: 2025-02-04
Attending: INTERNAL MEDICINE
Payer: COMMERCIAL

## 2025-02-04 ENCOUNTER — MYC MEDICAL ADVICE (OUTPATIENT)
Dept: PHARMACY | Facility: CLINIC | Age: 30
End: 2025-02-04
Payer: COMMERCIAL

## 2025-02-04 ENCOUNTER — TELEPHONE (OUTPATIENT)
Dept: RHEUMATOLOGY | Facility: CLINIC | Age: 30
End: 2025-02-04
Payer: COMMERCIAL

## 2025-02-04 DIAGNOSIS — Z79.899 IMMUNOCOMPROMISED STATE DUE TO DRUG THERAPY: Primary | ICD-10-CM

## 2025-02-04 DIAGNOSIS — D84.821 IMMUNOCOMPROMISED STATE DUE TO DRUG THERAPY: Primary | ICD-10-CM

## 2025-02-04 DIAGNOSIS — Z71.85 VACCINE COUNSELING: ICD-10-CM

## 2025-02-04 DIAGNOSIS — M46.90 AXIAL SPONDYLOARTHRITIS: Primary | ICD-10-CM

## 2025-02-04 DIAGNOSIS — M46.90 AXIAL SPONDYLOARTHRITIS: ICD-10-CM

## 2025-02-04 NOTE — TELEPHONE ENCOUNTER
PA Initiation    Medication: HUMIRA (2 PEN) 40 MG/0.4ML SC AJKT  Insurance Company: [facility] Specialty Prior Auth Dept, phone  [phone], Fax [phone]  Pharmacy Filling the Rx:    Filling Pharmacy Phone:    Filling Pharmacy Fax:    Start Date: 2/4/2025

## 2025-02-04 NOTE — Clinical Note
Humira education provided, did you want to redo hepatitis labs or okay using labs from 2018? I was going to talk to you about prednisone but looks like that was ordered today! He had told me in visit he just wanted it on hand to use as needed but looks like he is feeling like he needs it. He told me Humira was denied, I asked liaison for denial fax and will appeal once I have it. Thanks!

## 2025-02-04 NOTE — PROGRESS NOTES
Medication Therapy Management (MTM) Encounter    ASSESSMENT:                            Medication Adherence/Access: Humira PA denied, waiting on fax for denial reasons then will appeal.    Axial spondyloarthritis: Patient with uncontrolled spondyloarthritis symptoms previously unable to tolerate sulfasalazine and currently without disease modifying therapy. Would benefit from starting Humira as prescribed. Education provided today including administration, monitoring, common and serious side effects (including risk of infection and small risk of malignancy), and time to effectiveness. Note last hepatitis B labs were done in 2018, consider updating. Update - patient endorsing worsening pain in hands, fingers, and lower back, would benefit from starting prednisone burst/taper as previously discussed with provider.    Vaccine counseling: Indicated for COVID and flu boosters, PCV20, and Shingrix per ACIP recommendations.    PLAN:                            Update - start prednisone 15 mg daily x5 days, then 10 mg x5 days, then 7.5 mg x5 days.  Will discuss hepatitis B labs with provider and order if needed.  Once approved, start Humira 40 mg every 14 days. Report infections, planned surgeries, or disease flares to the rheumatology department.  Continue ibuprofen 200 mg daily as needed, limit use due to slight creatinine elevations.  Consider the following vaccines: COVID and flu boosters, Prevnar 20, Shingrix.    Follow-up: 3 months for Humira tolerability and effectiveness. Sees rheumatologist 2/26/25.    SUBJECTIVE/OBJECTIVE:                            Blake Allen is a 29 year old male contacted via secure video for an initial visit. He was referred to me from Tatiana Montes De Oca MD.      Reason for visit: Humira start.    Allergies/ADRs: Reviewed in chart  Past Medical History: Reviewed in chart  Tobacco: He reports that he has quit smoking. He has never used smokeless tobacco.  Alcohol: not assessed  "today    Medication Adherence/Access: Humira PA denied, waiting on fax for denial reasons then will appeal.    Axial spondyloarthritis:   Humira 40 mg every 14 days  Ibuprofen 200 mg every 6 hours as needed    Previously tried: sulfasalazine (side effects), meloxicam    Patient endorses sharp pain in lower back and hips. Also has had increased inflammation in hands and feet, \"not visible swelling but can definitely feel it.\" He has reduced dexterity as a result. He is no longer taking sulfasalazine, previous provider left the health systema and he didn't re-establish until now. It was working well but side effects \"were not worth it.\" Was having lightheadedness and low energy as well as forgetfulness. This improved \"instantly\" when he stopped it. Has not been on other medications besides NSAIDs. No longer taking gabapentin or Flexeril. Does not take ibuprofen daily, just intermittently. He avoids NSAIDs due to slightly elevated kidney values.    TB negative 1/2025  Hep B labs last done 2018    Baseline CMP, CBC, CRP completed    Vaccine counseling: Open to pneumonia and Shingrix vaccines. Send prescriptions to [facility] on [address] in [place].    Immunization History   Administered Date(s) Administered    DTaP, Unspecified 03/24/2000    Flu-nasal, Unspecified 10/07/2008    HepB, Unspecified 1995, 1995, 1995    Hepatitis B, Adult 1995, 1995    Hepatitis B, Peds 1995    Historical DTP/aP 03/24/2000    Influenza (prior to 2024) 12/23/2010    Influenza Intranasal Vaccine 10/07/2008    Influenza Vaccine >6 months,quad, PF 04/24/2019    Influenza Vaccine, 6+MO IM (QUADRIVALENT W/PRESERVATIVES) 02/20/2018    MMR 04/25/1996, 03/24/2000    OPV, trivalent, live 1995, 1995, 1995, 03/24/2000    TDAP (Adacel,Boostrix) 06/19/2007, 02/20/2018    TRIHIBIT (DTAP/HIB, <7y) 1995, 1995, 1995, 04/25/1996       Today's Vitals: There were no vitals taken for this " visit.  ----------------    I spent 27 minutes with this patient today. All changes were made via collaborative practice agreement with Tatiana Montes De Oca. A copy of the visit note was provided to the patient's provider(s).    A summary of these recommendations was sent via [employer].    Osiris Dillon, PharmD  Medication Therapy Management Pharmacist  [facility]     Telemedicine Visit Details  Type of service:  Video Conference via [employer]  Start Time:  1100  End Time:  1127     Medication Therapy Recommendations  No medication therapy recommendations to display

## 2025-02-05 ENCOUNTER — MYC MEDICAL ADVICE (OUTPATIENT)
Dept: RHEUMATOLOGY | Facility: CLINIC | Age: 30
End: 2025-02-05
Payer: COMMERCIAL

## 2025-02-05 DIAGNOSIS — M46.90 AXIAL SPONDYLOARTHRITIS: ICD-10-CM

## 2025-02-06 DIAGNOSIS — M46.90 AXIAL SPONDYLOARTHRITIS: Primary | ICD-10-CM

## 2025-02-06 RX ORDER — PREDNISONE 5 MG/1
TABLET ORAL
Qty: 33 TABLET | Refills: 0 | Status: SHIPPED | OUTPATIENT
Start: 2025-02-06 | End: 2025-02-06

## 2025-02-06 RX ORDER — PREDNISONE 5 MG/1
TABLET ORAL
Qty: 33 TABLET | Refills: 0 | Status: SHIPPED | OUTPATIENT
Start: 2025-02-06 | End: 2025-02-21

## 2025-02-06 NOTE — PATIENT INSTRUCTIONS
"Recommendations from today's MTM visit:                                                      Update - start prednisone 15 mg daily x5 days, then 10 mg x5 days, then 7.5 mg x5 days.  Will discuss hepatitis B labs with provider and order if needed.  Once approved, start Humira 40 mg every 14 days. Report infections, planned surgeries, or disease flares to the rheumatology department.  Continue ibuprofen 200 mg daily as needed, limit use due to slight creatinine elevations.  Consider the following vaccines: COVID and flu boosters, Prevnar 20, Shingrix.    Follow-up: 3 months for Humira tolerability and effectiveness. Sees rheumatologist 2/26/25.    It was great speaking with you today.  I value your experience and would be very thankful for your time in providing feedback in our clinic survey. In the next few days, you may receive an email or text message from [employer] with a link to a survey related to your  clinical pharmacist.\"     To schedule another MTM appointment, please call the clinic directly or you may call the MTM scheduling line at [phone].    My Clinical Pharmacist's contact information:                                                      Please feel free to contact me with any questions or concerns you have.      Osiris Dillon, PharmD  Medication Therapy Management Pharmacist  [facility]    "

## 2025-02-06 NOTE — TELEPHONE ENCOUNTER
PRIOR AUTHORIZATION DENIED    Medication: HUMIRA (2 PEN) 40 MG/0.4ML SC AJKT  Insurance Company: [facility] Prior Auth Dept, phone  [phone], Fax [phone]  Denial Date: 2/4/2025  Denial Reason(s):           Appeal Information:         Patient Notified: Patient aware

## 2025-02-06 NOTE — TELEPHONE ENCOUNTER
Patient called and aware.  RX sent to [facility] [place], [facility] [place] [facility] patient request.     Sonali Andrade RN

## 2025-02-06 NOTE — TELEPHONE ENCOUNTER
Patient calling with increased pain kristin in the hands, fingers, and low back.  States hands and fingers are swollen.  No redness or warmth noted.  Difficulty making a fist or typing on the computer.  Difficulty falling asleep due to pain.  States he is unable to take advil.  Humira was recently denied.  Patient is wondering what can be done to help with the pain?  Uses [facility] [place].     Sonali Andrade RN

## 2025-02-10 RX ORDER — CERTOLIZUMAB PEGOL 200 MG/ML
400 INJECTION, SOLUTION SUBCUTANEOUS
Qty: 6 ML | Refills: 0 | Status: SHIPPED | OUTPATIENT
Start: 2025-02-10

## 2025-02-26 ENCOUNTER — OFFICE VISIT (OUTPATIENT)
Dept: RHEUMATOLOGY | Facility: CLINIC | Age: 30
End: 2025-02-26
Payer: COMMERCIAL

## 2025-02-26 VITALS
BODY MASS INDEX: 34.16 KG/M2 | RESPIRATION RATE: 12 BRPM | DIASTOLIC BLOOD PRESSURE: 85 MMHG | SYSTOLIC BLOOD PRESSURE: 120 MMHG | HEART RATE: 77 BPM | WEIGHT: 244.9 LBS | OXYGEN SATURATION: 97 %

## 2025-02-26 DIAGNOSIS — M46.90 AXIAL SPONDYLOARTHRITIS: Primary | ICD-10-CM

## 2025-02-26 DIAGNOSIS — M46.1 SACROILIITIS: ICD-10-CM

## 2025-02-26 DIAGNOSIS — Z15.89 HLA B27 (HLA B27 POSITIVE): ICD-10-CM

## 2025-02-26 DIAGNOSIS — Z79.899 LONG-TERM USE OF HIGH-RISK MEDICATION: ICD-10-CM

## 2025-02-26 DIAGNOSIS — Z84.0 FAMILY HISTORY OF PSORIASIS: ICD-10-CM

## 2025-02-26 PROCEDURE — 99214 OFFICE O/P EST MOD 30 MIN: CPT | Performed by: INTERNAL MEDICINE

## 2025-02-26 PROCEDURE — G2211 COMPLEX E/M VISIT ADD ON: HCPCS | Performed by: INTERNAL MEDICINE

## 2025-02-26 ASSESSMENT — PAIN SCALES - GENERAL: PAINLEVEL_OUTOF10: MODERATE PAIN (4)

## 2025-02-26 NOTE — PROGRESS NOTES
Chief Complaint/Reason for Visit: axial spondyloarthritis    HPI:    Blake Allen is a 29 year old White male with past medical history listed below.  He just recently started using cimzia injection in Feb. He is c/o pain of hands and feels like his hands are swollen. Continues to have pain and stiffness of his back.     REVIEW OF SYSTEMS    General - neg for fever  Cardiovascular - neg for chest pain  Respiratory - neg for sob     No past medical history on file.  Past Surgical History:   Procedure Laterality Date    HC REMOVAL ADENOIDS,PRIMARY,<11 Y/O      Description: Adenoidectomy;  Recorded: 07/23/2008;     Family History   Problem Relation Age of Onset    Ankylosing Spondylitis Mother      Social History     Socioeconomic History    Marital status: Single     Spouse name: None    Number of children: None    Years of education: None    Highest education level: None   Tobacco Use    Smoking status: Former    Smokeless tobacco: Never    Tobacco comments:     quit 3 months ago   Social History Narrative    Diet-    Exercise-       No Known Allergies    Current Outpatient Medications   Medication Sig Dispense Refill    certolizumab pegol (CIMZIA-STARTER) 200 MG/ML PSKT 2 syringes/kit Inject 2 mLs (400 mg) subcutaneously every 14 days. At weeks 0, 2, and 4 for loading dose, then proceed with 1 mL (200 mg) every 2 weeks thereafter. 6 mL 0    certolizumab pegol (CIMZIA) 200 MG/ML PSKT 2 syringes/kit Inject 1 mL (200 mg) subcutaneously every 14 days. Hold for signs of infection, and seek medical attention. 2 mL 3    ibuprofen (ADVIL,MOTRIN) 200 MG tablet Take 200 mg by mouth every 6 hours as needed      zoster vaccine recombinant adjuvanted (SHINGRIX) injection Inject 0.5 mL into the muscle for first dose, repeat in 2-6 months to complete series. 1 each 1     No current facility-administered medications for this visit.       PHYSICAL EXAM    /85 (BP Location: Left arm, Patient  Position: Sitting, Cuff Size: Adult Large)   Pulse 77   Resp 12   Wt 111.1 kg (244 lb 14.4 oz)   SpO2 97%   BMI 34.16 kg/m      General: Alert, No apparent distress   Psych: Affect euthymic  Eyes: Sclera non injected  Cardiovascular: Regular rate and rhythm, Normal S1 and S2 and No murmurs, rubs or gallops  Respiratory: Clear to auscultation with no wheezing or crackles  Neuro: Normal gait and Able to arise from seated position unassisted  Musculoskeletal: Hands:  Normal. No synovitis   Wrists:  Normal.      LABS   Reviewed as below.     Jan 2025  CBC - normal wbc, hb and plt  CMP - creatinine elevated at 1.18, normal LFT  CRP normal    2022  QTB neg      Latest Reference Range & Units 06/20/18 12:23 04/24/19 13:23   Hep B Surface Agn Negative  Negative Negative   Hepatitis A IgM Leena Negative   Negative   Hepatitis B Core IgM Negative   Negative   Hepatitis C Antibody Negative  Negative Negative       ASSESSMENT      1. Axial spondyloarthritis    2. HLA B27 (HLA B27 positive)    3. Sacroiliitis    4. Family history of psoriasis    5. Long-term use of high-risk medication      (M46.90) Axial spondyloarthritis  (primary encounter diagnosis)  (Z15.89) HLA B27 (HLA B27 positive)  (M46.1) Sacroiliitis  (Z84.0) Family history of psoriasis  Comment:  HLA B 27 pos. MR SI joint in 2025 - Imaging finding can be concordant with sequale of bilateral chronic sacroiliitis with active sacroiliitis on left iliac aspect. Started on certolizumab in Feb 2025.  Plan:   Start certolizumab 200 mg subcutaneously every 14 days.  Labs at next visit.      (Z79.899) Long-term use of high-risk medication  Comment: Certolizumab  Plan: have discussed with potential adverse effects with use of biologics such as suppression of immune system making pt more prone to infections, to hold the medication if the patient has any signs of any infection and to restart only after infection has cleared, reactivation of infections particularly TB and  hepatitis B, C, fungal infections, risk of demyelinating disorders and to stop medication immediately if there's any weakness or paresthesias, risk of malignancy. Patient verbalized understanding and agrees to proceed.      ANDRZEJ WANG MD    Division of Rheumatic & Autoimmune Diseases  [facility]

## 2025-04-02 ENCOUNTER — MYC MEDICAL ADVICE (OUTPATIENT)
Dept: RHEUMATOLOGY | Facility: CLINIC | Age: 30
End: 2025-04-02
Payer: COMMERCIAL

## 2025-04-02 NOTE — TELEPHONE ENCOUNTER
Patient called regarding MyChart message sent.  C/o temp and productive cough for the past 2 days.  Temp 101.  Negative covid test.  Due to dose cimzia today and is wondering if he should? Please advise.    Sonali Andrade RN

## 2025-04-03 NOTE — TELEPHONE ENCOUNTER
Patient called and message relayed.  Patient states he is feeling better today.  No fever for 24 hours.  Drainage is better.      Sonali Andrade RN

## 2025-04-30 ENCOUNTER — VIRTUAL VISIT (OUTPATIENT)
Dept: PHARMACY | Facility: CLINIC | Age: 30
End: 2025-04-30
Attending: INTERNAL MEDICINE
Payer: COMMERCIAL

## 2025-04-30 DIAGNOSIS — M46.90 AXIAL SPONDYLOARTHRITIS: Primary | ICD-10-CM

## 2025-04-30 DIAGNOSIS — Z71.85 VACCINE COUNSELING: ICD-10-CM

## 2025-04-30 NOTE — PROGRESS NOTES
"Medication Therapy Management (MTM) Encounter    ASSESSMENT:                            Medication Adherence/Access: No issues identified.    Axial spondyloarthritis: Patient reporting significant subjective improvement in pain and swelling following > 3 months on Cimzia. No recent infections or side effects of concern. Would benefit from continuing current therapy.    Vaccine counseling: Indicated for COVID and flu boosters, PCV20, and Shingrix per ACIP recommendations. No changes from last visit.    PLAN:                            Continue Cimzia 200 mg every 14 days.  Continue acetaminophen 1000 mg every 6 hours as needed.  Consider the following vaccines: COVID and flu boosters, Prevnar 20, Shingrix.    Follow-up: 6 months. No follow up currently scheduled with rheumatologist.    SUBJECTIVE/OBJECTIVE:                          Blake Allen is a 29 year old male contacted via secure video for a follow up visit from 2/4/25. He was referred to me from Tatiana Montes De Oca MD.      Reason for visit: Cimzia 3 month follow up.    Allergies/ADRs: Reviewed in chart  Past Medical History: Reviewed in chart  Tobacco: He reports that he has quit smoking. He has never used smokeless tobacco.  Alcohol: not assessed today    Medication Adherence/Access: No issues identified.    Axial spondyloarthritis:   Cimzia 200 mg every 14 days  Acetaminophen 1000 mg every 6 hours as needed    Previously tried: sulfasalazine (side effects), meloxicam (avoiding due to elevated kidney values)    Cimzia is going well, did have to delay one injection for infection but otherwise no side effects. Was having some paleness around the injection site but this has resolved. Injections themselves are going okay. He has noticed benefit. Flares are shorter and less frequent. Energy has improved because he \"isn't in pain all the time.\" Has also had significant improvement in swelling in hands and feet. Does still have lower back and hip pain. Takes Tylenol " twice weekly. He has no concerns today.    Vaccine counseling: Open to pneumonia and Shingrix vaccines. Prescriptions previously sent to pharmacy but has not had done yet.    Immunization History   Administered Date(s) Administered    DTaP, Unspecified 03/24/2000    Flu-nasal, Unspecified 10/07/2008    HepB, Unspecified 1995, 1995, 1995    Hepatitis B, Adult (Energix-B/Recombivax HB) 1995, 1995    Hepatitis B, Peds (Engerix-B/Recombivax HB) 1995    Historical DTP/aP 03/24/2000    Influenza (prior to 2024) 12/23/2010    Influenza Intranasal Vaccine 10/07/2008    Influenza Vaccine >6 months,quad, PF 04/24/2019    Influenza Vaccine, 6+MO IM (QUADRIVALENT W/PRESERVATIVES) 02/20/2018    Influenza, Split Virus, Trivalent, Pf (Fluzone\Fluarix) 12/23/2010    MMR (MMRII) 04/25/1996, 03/24/2000    OPV, trivalent, live 1995, 1995, 1995, 03/24/2000    TDAP (Adacel,Boostrix) 06/19/2007, 02/20/2018    TRIHIBIT (DTAP/HIB, <7y) 1995, 1995, 1995, 04/25/1996       Today's Vitals: There were no vitals taken for this visit.  ----------------    I spent 10 minutes with this patient today. All changes were made via collaborative practice agreement with Tatiana Montes De Oca. A copy of the visit note was provided to the patient's provider(s).    A summary of these recommendations was sent via [employer].    Osiris Dillon, PharmD  Medication Therapy Management Pharmacist  [facility]     Telemedicine Visit Details  Type of service:  Video Conference via [employer]  Start Time: 1200  End Time: 1210  Patient location: off-site/home  Provider location: off-site/virtual     Medication Therapy Recommendations  No medication therapy recommendations to display

## 2025-04-30 NOTE — PATIENT INSTRUCTIONS
"Recommendations from today's MTM visit:                                                      Continue Cimzia 200 mg every 14 days.  Continue acetaminophen 1000 mg every 6 hours as needed.  Consider the following vaccines: COVID and flu boosters, Prevnar 20, Shingrix.    Follow-up: 6 months. No follow up currently scheduled with rheumatologist.    It was great speaking with you today.  I value your experience and would be very thankful for your time in providing feedback in our clinic survey. In the next few days, you may receive an email or text message from [employer] with a link to a survey related to your  clinical pharmacist.\"     To schedule another MTM appointment, please call the clinic directly or you may call the MTM scheduling line at [phone].    My Clinical Pharmacist's contact information:                                                      Please feel free to contact me with any questions or concerns you have.      Osiris Dillon, PharmD  Medication Therapy Management Pharmacist  [facility]    "

## 2025-06-14 ENCOUNTER — HEALTH MAINTENANCE LETTER (OUTPATIENT)
Age: 30
End: 2025-06-14

## 2025-06-18 DIAGNOSIS — M46.90 AXIAL SPONDYLOARTHRITIS: Primary | ICD-10-CM
